# Patient Record
Sex: FEMALE | Race: WHITE | NOT HISPANIC OR LATINO
[De-identification: names, ages, dates, MRNs, and addresses within clinical notes are randomized per-mention and may not be internally consistent; named-entity substitution may affect disease eponyms.]

---

## 2018-11-09 ENCOUNTER — TRANSCRIPTION ENCOUNTER (OUTPATIENT)
Age: 83
End: 2018-11-09

## 2020-09-28 ENCOUNTER — EMERGENCY (EMERGENCY)
Facility: HOSPITAL | Age: 85
LOS: 1 days | Discharge: HOME | End: 2020-09-28
Attending: EMERGENCY MEDICINE
Payer: MEDICAID

## 2020-09-28 PROCEDURE — 99285 EMERGENCY DEPT VISIT HI MDM: CPT

## 2020-09-29 ENCOUNTER — INPATIENT (INPATIENT)
Facility: HOSPITAL | Age: 85
LOS: 2 days | Discharge: HOME | DRG: 445 | End: 2020-10-02
Attending: INTERNAL MEDICINE | Admitting: INTERNAL MEDICINE
Payer: MEDICAID

## 2020-09-29 VITALS
HEART RATE: 73 BPM | TEMPERATURE: 99 F | RESPIRATION RATE: 19 BRPM | SYSTOLIC BLOOD PRESSURE: 113 MMHG | DIASTOLIC BLOOD PRESSURE: 67 MMHG | OXYGEN SATURATION: 95 %

## 2020-09-29 VITALS
RESPIRATION RATE: 16 BRPM | DIASTOLIC BLOOD PRESSURE: 79 MMHG | TEMPERATURE: 103 F | OXYGEN SATURATION: 94 % | SYSTOLIC BLOOD PRESSURE: 130 MMHG | HEART RATE: 80 BPM

## 2020-09-29 VITALS
SYSTOLIC BLOOD PRESSURE: 145 MMHG | TEMPERATURE: 98 F | DIASTOLIC BLOOD PRESSURE: 67 MMHG | RESPIRATION RATE: 18 BRPM | HEART RATE: 68 BPM | OXYGEN SATURATION: 96 %

## 2020-09-29 DIAGNOSIS — R94.5 ABNORMAL RESULTS OF LIVER FUNCTION STUDIES: ICD-10-CM

## 2020-09-29 DIAGNOSIS — I10 ESSENTIAL (PRIMARY) HYPERTENSION: ICD-10-CM

## 2020-09-29 DIAGNOSIS — Z96.649 PRESENCE OF UNSPECIFIED ARTIFICIAL HIP JOINT: Chronic | ICD-10-CM

## 2020-09-29 DIAGNOSIS — Z29.9 ENCOUNTER FOR PROPHYLACTIC MEASURES, UNSPECIFIED: ICD-10-CM

## 2020-09-29 DIAGNOSIS — F41.9 ANXIETY DISORDER, UNSPECIFIED: ICD-10-CM

## 2020-09-29 DIAGNOSIS — I50.9 HEART FAILURE, UNSPECIFIED: ICD-10-CM

## 2020-09-29 DIAGNOSIS — R78.81 BACTEREMIA: ICD-10-CM

## 2020-09-29 LAB
ALBUMIN SERPL ELPH-MCNC: 3.2 G/DL — LOW (ref 3.5–5)
ALBUMIN SERPL ELPH-MCNC: 3.3 G/DL — LOW (ref 3.5–5)
ALP SERPL-CCNC: 207 U/L — HIGH (ref 40–120)
ALP SERPL-CCNC: 228 U/L — HIGH (ref 40–120)
ALT FLD-CCNC: 226 U/L DA — HIGH (ref 10–60)
ALT FLD-CCNC: 304 U/L DA — HIGH (ref 10–60)
ANION GAP SERPL CALC-SCNC: 6 MMOL/L — SIGNIFICANT CHANGE UP (ref 5–17)
ANION GAP SERPL CALC-SCNC: 8 MMOL/L — SIGNIFICANT CHANGE UP (ref 5–17)
APPEARANCE UR: CLEAR — SIGNIFICANT CHANGE UP
APPEARANCE UR: CLEAR — SIGNIFICANT CHANGE UP
APTT BLD: 30.6 SEC — SIGNIFICANT CHANGE UP (ref 27.5–35.5)
AST SERPL-CCNC: 186 U/L — HIGH (ref 10–40)
AST SERPL-CCNC: 526 U/L — HIGH (ref 10–40)
BACTERIA # UR AUTO: ABNORMAL /HPF
BASOPHILS # BLD AUTO: 0.02 K/UL — SIGNIFICANT CHANGE UP (ref 0–0.2)
BASOPHILS # BLD AUTO: 0.03 K/UL — SIGNIFICANT CHANGE UP (ref 0–0.2)
BASOPHILS NFR BLD AUTO: 0.2 % — SIGNIFICANT CHANGE UP (ref 0–2)
BASOPHILS NFR BLD AUTO: 0.3 % — SIGNIFICANT CHANGE UP (ref 0–2)
BILIRUB SERPL-MCNC: 2.4 MG/DL — HIGH (ref 0.2–1.2)
BILIRUB SERPL-MCNC: 2.8 MG/DL — HIGH (ref 0.2–1.2)
BILIRUB UR-MCNC: NEGATIVE — SIGNIFICANT CHANGE UP
BILIRUB UR-MCNC: NEGATIVE — SIGNIFICANT CHANGE UP
BUN SERPL-MCNC: 17 MG/DL — SIGNIFICANT CHANGE UP (ref 7–18)
BUN SERPL-MCNC: 17 MG/DL — SIGNIFICANT CHANGE UP (ref 7–18)
CALCIUM SERPL-MCNC: 8.5 MG/DL — SIGNIFICANT CHANGE UP (ref 8.4–10.5)
CALCIUM SERPL-MCNC: 8.7 MG/DL — SIGNIFICANT CHANGE UP (ref 8.4–10.5)
CHLORIDE SERPL-SCNC: 101 MMOL/L — SIGNIFICANT CHANGE UP (ref 96–108)
CHLORIDE SERPL-SCNC: 102 MMOL/L — SIGNIFICANT CHANGE UP (ref 96–108)
CO2 SERPL-SCNC: 24 MMOL/L — SIGNIFICANT CHANGE UP (ref 22–31)
CO2 SERPL-SCNC: 24 MMOL/L — SIGNIFICANT CHANGE UP (ref 22–31)
COLOR SPEC: YELLOW — SIGNIFICANT CHANGE UP
COLOR SPEC: YELLOW — SIGNIFICANT CHANGE UP
CREAT SERPL-MCNC: 1.04 MG/DL — SIGNIFICANT CHANGE UP (ref 0.5–1.3)
CREAT SERPL-MCNC: 1.06 MG/DL — SIGNIFICANT CHANGE UP (ref 0.5–1.3)
DIFF PNL FLD: ABNORMAL
DIFF PNL FLD: NEGATIVE — SIGNIFICANT CHANGE UP
E COLI DNA BLD POS QL NAA+NON-PROBE: SIGNIFICANT CHANGE UP
EOSINOPHIL # BLD AUTO: 0 K/UL — SIGNIFICANT CHANGE UP (ref 0–0.5)
EOSINOPHIL # BLD AUTO: 0.09 K/UL — SIGNIFICANT CHANGE UP (ref 0–0.5)
EOSINOPHIL NFR BLD AUTO: 0 % — SIGNIFICANT CHANGE UP (ref 0–6)
EOSINOPHIL NFR BLD AUTO: 0.8 % — SIGNIFICANT CHANGE UP (ref 0–6)
EPI CELLS # UR: SIGNIFICANT CHANGE UP /HPF
GLUCOSE SERPL-MCNC: 102 MG/DL — HIGH (ref 70–99)
GLUCOSE SERPL-MCNC: 123 MG/DL — HIGH (ref 70–99)
GLUCOSE UR QL: NEGATIVE — SIGNIFICANT CHANGE UP
GLUCOSE UR QL: NEGATIVE — SIGNIFICANT CHANGE UP
GRAM STN FLD: SIGNIFICANT CHANGE UP
GRAM STN FLD: SIGNIFICANT CHANGE UP
HCT VFR BLD CALC: 36 % — SIGNIFICANT CHANGE UP (ref 34.5–45)
HCT VFR BLD CALC: 36.7 % — SIGNIFICANT CHANGE UP (ref 34.5–45)
HGB BLD-MCNC: 12.1 G/DL — SIGNIFICANT CHANGE UP (ref 11.5–15.5)
HGB BLD-MCNC: 12.5 G/DL — SIGNIFICANT CHANGE UP (ref 11.5–15.5)
IMM GRANULOCYTES NFR BLD AUTO: 0.3 % — SIGNIFICANT CHANGE UP (ref 0–1.5)
IMM GRANULOCYTES NFR BLD AUTO: 0.5 % — SIGNIFICANT CHANGE UP (ref 0–1.5)
INR BLD: 1.13 RATIO — SIGNIFICANT CHANGE UP (ref 0.88–1.16)
KETONES UR-MCNC: ABNORMAL
KETONES UR-MCNC: NEGATIVE — SIGNIFICANT CHANGE UP
LACTATE SERPL-SCNC: 1 MMOL/L — SIGNIFICANT CHANGE UP (ref 0.7–2)
LACTATE SERPL-SCNC: 1.5 MMOL/L — SIGNIFICANT CHANGE UP (ref 0.7–2)
LEUKOCYTE ESTERASE UR-ACNC: ABNORMAL
LEUKOCYTE ESTERASE UR-ACNC: ABNORMAL
LYMPHOCYTES # BLD AUTO: 0.59 K/UL — LOW (ref 1–3.3)
LYMPHOCYTES # BLD AUTO: 1.34 K/UL — SIGNIFICANT CHANGE UP (ref 1–3.3)
LYMPHOCYTES # BLD AUTO: 11.4 % — LOW (ref 13–44)
LYMPHOCYTES # BLD AUTO: 4.5 % — LOW (ref 13–44)
MAGNESIUM SERPL-MCNC: 2.1 MG/DL — SIGNIFICANT CHANGE UP (ref 1.6–2.6)
MCHC RBC-ENTMCNC: 27.3 PG — SIGNIFICANT CHANGE UP (ref 27–34)
MCHC RBC-ENTMCNC: 27.5 PG — SIGNIFICANT CHANGE UP (ref 27–34)
MCHC RBC-ENTMCNC: 33.6 GM/DL — SIGNIFICANT CHANGE UP (ref 32–36)
MCHC RBC-ENTMCNC: 34.1 GM/DL — SIGNIFICANT CHANGE UP (ref 32–36)
MCV RBC AUTO: 80.8 FL — SIGNIFICANT CHANGE UP (ref 80–100)
MCV RBC AUTO: 81.1 FL — SIGNIFICANT CHANGE UP (ref 80–100)
METHOD TYPE: SIGNIFICANT CHANGE UP
MONOCYTES # BLD AUTO: 0.92 K/UL — HIGH (ref 0–0.9)
MONOCYTES # BLD AUTO: 1.5 K/UL — HIGH (ref 0–0.9)
MONOCYTES NFR BLD AUTO: 12.8 % — SIGNIFICANT CHANGE UP (ref 2–14)
MONOCYTES NFR BLD AUTO: 7 % — SIGNIFICANT CHANGE UP (ref 2–14)
NEUTROPHILS # BLD AUTO: 11.52 K/UL — HIGH (ref 1.8–7.4)
NEUTROPHILS # BLD AUTO: 8.73 K/UL — HIGH (ref 1.8–7.4)
NEUTROPHILS NFR BLD AUTO: 74.4 % — SIGNIFICANT CHANGE UP (ref 43–77)
NEUTROPHILS NFR BLD AUTO: 87.8 % — HIGH (ref 43–77)
NITRITE UR-MCNC: NEGATIVE — SIGNIFICANT CHANGE UP
NITRITE UR-MCNC: POSITIVE
NRBC # BLD: 0 /100 WBCS — SIGNIFICANT CHANGE UP (ref 0–0)
NRBC # BLD: 0 /100 WBCS — SIGNIFICANT CHANGE UP (ref 0–0)
NT-PROBNP SERPL-SCNC: 2408 PG/ML — HIGH (ref 0–450)
PH UR: 5 — SIGNIFICANT CHANGE UP (ref 5–8)
PH UR: 6 — SIGNIFICANT CHANGE UP (ref 5–8)
PLATELET # BLD AUTO: 196 K/UL — SIGNIFICANT CHANGE UP (ref 150–400)
PLATELET # BLD AUTO: 197 K/UL — SIGNIFICANT CHANGE UP (ref 150–400)
POTASSIUM SERPL-MCNC: 3.8 MMOL/L — SIGNIFICANT CHANGE UP (ref 3.5–5.3)
POTASSIUM SERPL-MCNC: 3.9 MMOL/L — SIGNIFICANT CHANGE UP (ref 3.5–5.3)
POTASSIUM SERPL-SCNC: 3.8 MMOL/L — SIGNIFICANT CHANGE UP (ref 3.5–5.3)
POTASSIUM SERPL-SCNC: 3.9 MMOL/L — SIGNIFICANT CHANGE UP (ref 3.5–5.3)
PROT SERPL-MCNC: 6.7 G/DL — SIGNIFICANT CHANGE UP (ref 6–8.3)
PROT SERPL-MCNC: 6.8 G/DL — SIGNIFICANT CHANGE UP (ref 6–8.3)
PROT UR-MCNC: NEGATIVE — SIGNIFICANT CHANGE UP
PROT UR-MCNC: NEGATIVE — SIGNIFICANT CHANGE UP
PROTHROM AB SERPL-ACNC: 13.1 SEC — SIGNIFICANT CHANGE UP (ref 10.6–13.6)
RBC # BLD: 4.44 M/UL — SIGNIFICANT CHANGE UP (ref 3.8–5.2)
RBC # BLD: 4.54 M/UL — SIGNIFICANT CHANGE UP (ref 3.8–5.2)
RBC # FLD: 13.2 % — SIGNIFICANT CHANGE UP (ref 10.3–14.5)
RBC # FLD: 13.3 % — SIGNIFICANT CHANGE UP (ref 10.3–14.5)
RBC CASTS # UR COMP ASSIST: SIGNIFICANT CHANGE UP /HPF (ref 0–2)
SARS-COV-2 RNA SPEC QL NAA+PROBE: SIGNIFICANT CHANGE UP
SARS-COV-2 RNA SPEC QL NAA+PROBE: SIGNIFICANT CHANGE UP
SODIUM SERPL-SCNC: 132 MMOL/L — LOW (ref 135–145)
SODIUM SERPL-SCNC: 133 MMOL/L — LOW (ref 135–145)
SP GR SPEC: 1.01 — SIGNIFICANT CHANGE UP (ref 1.01–1.02)
SP GR SPEC: 1.01 — SIGNIFICANT CHANGE UP (ref 1.01–1.02)
SPECIMEN SOURCE: SIGNIFICANT CHANGE UP
SPECIMEN SOURCE: SIGNIFICANT CHANGE UP
TROPONIN I SERPL-MCNC: <0.015 NG/ML — SIGNIFICANT CHANGE UP (ref 0–0.04)
TROPONIN I SERPL-MCNC: <0.015 NG/ML — SIGNIFICANT CHANGE UP (ref 0–0.04)
UROBILINOGEN FLD QL: 1
UROBILINOGEN FLD QL: NEGATIVE — SIGNIFICANT CHANGE UP
WBC # BLD: 11.72 K/UL — HIGH (ref 3.8–10.5)
WBC # BLD: 13.11 K/UL — HIGH (ref 3.8–10.5)
WBC # FLD AUTO: 11.72 K/UL — HIGH (ref 3.8–10.5)
WBC # FLD AUTO: 13.11 K/UL — HIGH (ref 3.8–10.5)
WBC UR QL: SIGNIFICANT CHANGE UP /HPF (ref 0–5)

## 2020-09-29 PROCEDURE — 96374 THER/PROPH/DIAG INJ IV PUSH: CPT

## 2020-09-29 PROCEDURE — 99222 1ST HOSP IP/OBS MODERATE 55: CPT

## 2020-09-29 PROCEDURE — 76705 ECHO EXAM OF ABDOMEN: CPT | Mod: 26

## 2020-09-29 PROCEDURE — 71045 X-RAY EXAM CHEST 1 VIEW: CPT

## 2020-09-29 PROCEDURE — 85025 COMPLETE CBC W/AUTO DIFF WBC: CPT

## 2020-09-29 PROCEDURE — 93005 ELECTROCARDIOGRAM TRACING: CPT

## 2020-09-29 PROCEDURE — 81001 URINALYSIS AUTO W/SCOPE: CPT

## 2020-09-29 PROCEDURE — 82962 GLUCOSE BLOOD TEST: CPT

## 2020-09-29 PROCEDURE — 85730 THROMBOPLASTIN TIME PARTIAL: CPT

## 2020-09-29 PROCEDURE — 99284 EMERGENCY DEPT VISIT MOD MDM: CPT | Mod: 25

## 2020-09-29 PROCEDURE — 80053 COMPREHEN METABOLIC PANEL: CPT

## 2020-09-29 PROCEDURE — 87150 DNA/RNA AMPLIFIED PROBE: CPT

## 2020-09-29 PROCEDURE — 87086 URINE CULTURE/COLONY COUNT: CPT

## 2020-09-29 PROCEDURE — 85610 PROTHROMBIN TIME: CPT

## 2020-09-29 PROCEDURE — 99285 EMERGENCY DEPT VISIT HI MDM: CPT

## 2020-09-29 PROCEDURE — 87186 SC STD MICRODIL/AGAR DIL: CPT

## 2020-09-29 PROCEDURE — 87040 BLOOD CULTURE FOR BACTERIA: CPT

## 2020-09-29 PROCEDURE — 71045 X-RAY EXAM CHEST 1 VIEW: CPT | Mod: 26,77

## 2020-09-29 PROCEDURE — 36415 COLL VENOUS BLD VENIPUNCTURE: CPT

## 2020-09-29 PROCEDURE — 83605 ASSAY OF LACTIC ACID: CPT

## 2020-09-29 PROCEDURE — 71045 X-RAY EXAM CHEST 1 VIEW: CPT | Mod: 26

## 2020-09-29 PROCEDURE — 87635 SARS-COV-2 COVID-19 AMP PRB: CPT

## 2020-09-29 PROCEDURE — 84484 ASSAY OF TROPONIN QUANT: CPT

## 2020-09-29 RX ORDER — CEFUROXIME AXETIL 250 MG
1 TABLET ORAL
Qty: 14 | Refills: 0
Start: 2020-09-29 | End: 2020-10-05

## 2020-09-29 RX ORDER — ACETAMINOPHEN 500 MG
650 TABLET ORAL ONCE
Refills: 0 | Status: COMPLETED | OUTPATIENT
Start: 2020-09-29 | End: 2020-09-29

## 2020-09-29 RX ORDER — MEROPENEM 1 G/30ML
1000 INJECTION INTRAVENOUS ONCE
Refills: 0 | Status: COMPLETED | OUTPATIENT
Start: 2020-09-29 | End: 2020-09-29

## 2020-09-29 RX ORDER — ENOXAPARIN SODIUM 100 MG/ML
40 INJECTION SUBCUTANEOUS DAILY
Refills: 0 | Status: DISCONTINUED | OUTPATIENT
Start: 2020-09-29 | End: 2020-10-02

## 2020-09-29 RX ORDER — ACETAMINOPHEN 500 MG
2 TABLET ORAL
Qty: 20 | Refills: 0
Start: 2020-09-29

## 2020-09-29 RX ORDER — LANOLIN ALCOHOL/MO/W.PET/CERES
5 CREAM (GRAM) TOPICAL AT BEDTIME
Refills: 0 | Status: DISCONTINUED | OUTPATIENT
Start: 2020-09-29 | End: 2020-10-02

## 2020-09-29 RX ORDER — SODIUM CHLORIDE 9 MG/ML
2100 INJECTION INTRAMUSCULAR; INTRAVENOUS; SUBCUTANEOUS ONCE
Refills: 0 | Status: COMPLETED | OUTPATIENT
Start: 2020-09-29 | End: 2020-09-29

## 2020-09-29 RX ORDER — CEFTRIAXONE 500 MG/1
1000 INJECTION, POWDER, FOR SOLUTION INTRAMUSCULAR; INTRAVENOUS ONCE
Refills: 0 | Status: COMPLETED | OUTPATIENT
Start: 2020-09-29 | End: 2020-09-29

## 2020-09-29 RX ADMIN — CEFTRIAXONE 100 MILLIGRAM(S): 500 INJECTION, POWDER, FOR SOLUTION INTRAMUSCULAR; INTRAVENOUS at 01:25

## 2020-09-29 RX ADMIN — Medication 650 MILLIGRAM(S): at 02:52

## 2020-09-29 RX ADMIN — Medication 650 MILLIGRAM(S): at 01:25

## 2020-09-29 RX ADMIN — MEROPENEM 100 MILLIGRAM(S): 1 INJECTION INTRAVENOUS at 19:34

## 2020-09-29 RX ADMIN — SODIUM CHLORIDE 2100 MILLILITER(S): 9 INJECTION INTRAMUSCULAR; INTRAVENOUS; SUBCUTANEOUS at 01:26

## 2020-09-29 NOTE — ED ADULT NURSE NOTE - CHPI ED NUR SYMPTOMS NEG
no nausea/no chills/no tingling/no weakness/no pain/no vomiting/no decreased eating/drinking/no dizziness/no fever

## 2020-09-29 NOTE — ED PROVIDER NOTE - OBJECTIVE STATEMENT
as per daughter 89 y.o. female was seen in ED yest. for on & off MSCP/epigastric pain since yest. afternoon, pt vomited x3 yest. fever, chills last night, d/c home with Ceftin.  Pt continues with fever, weakness, no appetite, no coughing.  Daughter received a phone for abn blood culture-gram neg rods.  Last BM yest x2 loose stool, no dysuria.  Last given Tylenol this am as per daughter 89 y.o. female was seen in ED yest. for on & off MSCP/epigastric pain since yest. afternoon, pt vomited x3 yest. fever, chills last night, d/c home with Ceftin.  Pt continues with fever, weakness, no appetite, no coughing.  Daughter received a phone for abn blood culture-gram neg rods.  Last BM yest x2 loose stool, no dysuria, CP, abd pain.  Last given Tylenol this am

## 2020-09-29 NOTE — H&P ADULT - PROBLEM SELECTOR PLAN 4
Additional Safety/Bands: Patient takes Ramipril, Amlodipine and Bisoprolol.  will hold blood pressure medications for stable blood pressure.  restart as indicated Patient takes Ramipril, Amlodipine and Bisoprolol.  continue with home meds

## 2020-09-29 NOTE — ED PROVIDER NOTE - OBJECTIVE STATEMENT
89 y/o female (Andorran translation requested by adult daughter) presents to ED. Daughter states patient complained of chest pain yesterday afternoon and spiked a fever in the evening. No shortness of breath. No sick contacts reported. Patient was febrile on arrival. Code sepsis activated. Early abx and fluids administered. On evaluation patient feels better and is currently without complaints.

## 2020-09-29 NOTE — ED PROVIDER NOTE - NSFOLLOWUPINSTRUCTIONS_ED_ALL_ED_FT
Return if pain, fever, Return to ER immediately if your chest pain returns, if you have pain with radiation to arms, back or neck, if you feel short of breath, feel weak, feel fast or pounding heart beating, if you have any fever or vomiting.  If you need assistance with follow up appointments our Care Coordinator can be reached at [phone]. In addition the [facility] is located at [address] [place] [address], tel: [phone].

## 2020-09-29 NOTE — H&P ADULT - NSHPPHYSICALEXAM_GEN_ALL_CORE
Vital Signs Last 24 Hrs  T(C): 36.9 (29 Sep 2020 20:08), Max: 39.4 (29 Sep 2020 00:04)  T(F): 98.5 (29 Sep 2020 20:08), Max: 103 (29 Sep 2020 00:04)  HR: 67 (29 Sep 2020 20:08) (67 - 80)  BP: 159/75 (29 Sep 2020 20:08) (110/62 - 159/75)  BP(mean): --  RR: 17 (29 Sep 2020 20:08) (16 - 22)  SpO2: 96% (29 Sep 2020 20:08) (94% - 96%)    GENERAL: NAD, speaks in full sentences, no signs of respiratory distress  HEAD:  Atraumatic, Normocephalic  EYES: EOMI, PERRLA, conjunctiva and sclera clear  NECK: Supple, No JVD  CHEST/LUNG: Clear to auscultation bilaterally; No wheeze; No crackles; No accessory muscles used  HEART: Regular rate and rhythm; No murmurs;   ABDOMEN: Soft, Nontender, Nondistended; Bowel sounds present; No guarding  EXTREMITIES:  2+ Peripheral Pulses, No cyanosis or edema  RLE: small akin abrasion on anterior shin of R lower leg.   PSYCH:  Normal Affect  NEUROLOGY: non-focal, AAO X 3. No new deficits.  SKIN: small skin abrasion on anterior shin of R lower leg.

## 2020-09-29 NOTE — ED PROVIDER NOTE - NSFOLLOWUPCLINICS_GEN_ALL_ED_FT
[facility] [place]  [facility]  [address] [facility] [place] [address]  Phone: [phone]  Fax: [phone]  Follow Up Time:

## 2020-09-29 NOTE — H&P ADULT - PROBLEM SELECTOR PLAN 2
Patients ProBNP is elevated ( dyan than age corrected .  CXR showed some discoid atelectasias .  no signs of fluid over load on P/E  Follow ECHO cardiogram and cardiology recommendations.  Patient is on ACE and Bblocker . holding for stable blood pressure. Patients ProBNP is elevated ( more than age corrected .  CXR showed some discoid atelectasias .  no signs of fluid over load on P/E  Follow ECHO cardiogram and cardiology recommendations.  will continue with ACE and b Blocker  Lasix 40 mg PO added

## 2020-09-29 NOTE — H&P ADULT - ASSESSMENT
89 year old Malagasy speaking female AAO X 3 , lives with daughter presented to ED for epigastric /  mid sternal pain. Patient had 3 episodes of NBNB vomiting yesterday .   Patient admitted for treatment of bacteremia/ cellulitis

## 2020-09-29 NOTE — H&P ADULT - PROBLEM SELECTOR PLAN 1
Patients blood cultures grew gram negative Rods , final sensitivities pending.  Patient received 1 ose of Rocephin last night and 1 dose of meropenem tonigh.  Will start her on Zosyn .  Source of bacteremia canbe UTI as her UA was positive from yesterday with fever on presentation.  US hepatic and pancreatic done and noted , no intra abdominal pathology noted except for  10.4 cm. 5 mm sized cortical cyst in kidney.  follow reeat blood cultures sent today ( 9/29/20) Patients blood cultures grew gram negative Rods , final sensitivities pending.  Patient received 1 ose of Rocephin last night and 1 dose of meropenem tonigh.  Will start her on Augmentin  Source of bacteremia can be UTI as her UA was positive from yesterday with fever on presentation. or she has RLE laceration resulting from dog bite   US hepatic and pancreatic done and noted , no intra abdominal pathology noted except for  10.4 cm. 5 mm sized cortical cyst in kidney.  follow repeat blood cultures sent today ( 9/29/20)

## 2020-09-29 NOTE — H&P ADULT - PROBLEM SELECTOR PLAN 3
Patients AST, ALT, ALP and bilirubin are elevated,  US done and noted no liver or gall bladder pathology noted   will continue to trend Patients AST, ALT, ALP and bilirubin are elevated,  US done and noted showed hepatic steatosis, no  gall bladder pathology noted   will continue to trend  follow CT abdomen

## 2020-09-29 NOTE — ED POST DISCHARGE NOTE - ADDITIONAL DOCUMENTATION
All labs from Emergency Department visit which was immediately followed by admission will be addressed by inpatient team

## 2020-09-29 NOTE — ED PROVIDER NOTE - CLINICAL SUMMARY MEDICAL DECISION MAKING FREE TEXT BOX
Patient currently asymptomatic. Feels well. Requesting dc. Agrees for second set troponin prior to dc. Patient currently asymptomatic. Feels well. Requesting dc. Agrees for second set troponin prior to dc.  Trop x 2 neg, UA consistent with uti. Rx Ceftin. Pt is well appearing, has no new complaints and able to walk with normal gait. Pt is stable for discharge and follow up with medical doctor. Pt educated on care and need for follow up. Discussed anticipatory guidance and return precautions. Questions answered. I had a detailed discussion with the patient and/or guardian regarding the historical points, exam findings, and any diagnostic results supporting the discharge diagnosis.  Daughter will accompany pt home.

## 2020-09-29 NOTE — ED ADULT NURSE NOTE - NSFALLRSKHRMRISKTYPE_ED_ALL_ED
How Did The Scalp Condition Occur?: sudden in onset (over a period of weeks to a few months)
How Severe Is The Scalp Condition?: moderate
Additional History: Using t gel and head and shoulders
age(85 years old or older)

## 2020-09-29 NOTE — ED PROVIDER NOTE - MUSCULOSKELETAL, MLM
Spine appears normal, range of motion is not limited, no muscle or joint tenderness RLE- ant lower aspect-abrasion

## 2020-09-29 NOTE — ED ADULT NURSE NOTE - INTERVENTIONS DEFINITIONS
[place] to call system/Instruct patient to call for assistance/Stretcher in lowest position, wheels locked, appropriate side rails in place/Room bathroom lighting operational/Non-slip footwear when patient is off stretcher/Physically safe environment: no spills, clutter or unnecessary equipment/Provide visual clues: red socks/Call bell, personal items and telephone within reach/Monitor for mental status changes and reorient to person, place, and time/Monitor gait and stability

## 2020-09-29 NOTE — H&P ADULT - ATTENDING COMMENTS
Patient is a 89 year old female with a PMH of Heart Murmur, umbilical hernia, HTN who presented with a chief complaint of chest pain.  ([place]  ID: [id number]).  Patient states that over the course of the past 24 hours prior to presentation, she has been experiencing intermittent, midsternal chest and epigastric discomfort.  In addition, patient endorses nausea along with 3 episodes of non-bloody emesis, subjective fever/chills as well generalized weakness.  Of note, patient states that for the past 50 years, when she moves a certain way, she experiences pain in the right upper quadrant of her abdomen.    Patient endorses that approximately one week prior to presentation (though daughter, who was at bedside states it may be up to three weeks ago), patient was bitten by a dog on the anterior aspect of her right lower extremity.  Since the bite, the area has become somewhat painful with and without palpation and "more red".    Additionally of note, patient was seen in the [facility] ED on 9/28/2020 with similar symptoms.  Patient was discharged on Ceftin, though as her Blood Cultures returned positive for gram negative rods, patient was contacted and asked to return to [facility] ED for further evaluation.    T(C): 37.1 (09-29-20 @ 23:46), Max: 39.4 (09-29-20 @ 00:04)  T(F): 98.8 (09-29-20 @ 23:46), Max: 103 (09-29-20 @ 00:04)  HR: 62 (09-29-20 @ 23:46) (62 - 80)  BP: 129/66 (09-29-20 @ 23:46) (110/62 - 159/75)  RR: 18 (09-29-20 @ 23:46) (16 - 22)  SpO2: 95% (09-29-20 @ 23:46) (94% - 96%)  Wt(kg): --  P/E: As above MAR    A/P:    Acute CHF Exacerbation  -HEART Score= 3 (Age >=65, 1-2 Risk Factors), Low Score, Risk of MACE of 0.9-1.7%.  -STEPHANIE Score= 2 (Age >=65, Severe Angina >=2 episodes in 24 hours), 8% risk at 14 days of: all-cause mortality, new or recurrent MI, or severe recurrent ischemia requiring urgent revascularization.    -Pro-BNP= 2,408  -Will admit patient to Telemetry for continuous cardiac monitoring  -TTE  -Will continue to trend troponins with simultaneous acquisition of EKG  -Gentle diuresis  -Strict I&O  -Heart Healthy diet  -Fluid Restrictions  -Vital Signs Q4H  -Patient's  (passed away 4 years ago) was previously managing her "Heart murmur".  Therefore, should ask Cardiology to evaluate patient    Cellulitis of Right Lower Extremity:  -ALT-70 Score= 6 (Asymmetric, Age >=70, WBC in ED >=10,000), >82.2% likelihood of true cellulitis    -Patient's blood cultures (from date 9/28/2020) identified gram negative rods, though as patient has no s/sx of sepsis, but has a possible source of infection being from a dog bite approximately 1 week prior to presentation resulting in an infection with pasteurella aeruginosa (as well as with a variety of other potential microorganisms [including, but not limited to Pasteurella multocida and canis, Staphylococcus and Streptococcus species, Capnocytophaga canimorsus as well as other Gram-negative bacteria], will empirically treat with Augmentin 875/125mg BID, for now.  -Additionally, should obtain medical records from PCP of vaccination history (to verify Rabies vaccination history).  -No other apparently obvious source of infection, thus will also order CT Abdomen/Pelvis without IV contrast    -Should send Procalcitonin, ESR, CRP  -Should ask Infectious Disease (ID) to evaluate patient for further recommendations to narrow down antibiotic regiment    HTN:  -May continue patient's home medication regiment  -Vital Signs Q4H    GI/DVT PPx:  -Heparin  -Pepcid Patient is a 89 year old female with a PMH of Heart Murmur, umbilical hernia, HTN who presented with a chief complaint of chest pain.  ([place]agu[place]  ID: [id number]).  Patient states that over the course of the past 24 hours prior to presentation, she has been experiencing intermittent, midsternal chest and epigastric discomfort.  In addition, patient endorses nausea along with 3 episodes of non-bloody emesis, subjective fever/chills as well generalized weakness.  Of note, patient states that for the past 50 years, when she moves a certain way, she experiences pain in the right upper quadrant of her abdomen.    Patient endorses that approximately one week prior to presentation (though daughter, who was at bedside states it may be up to three weeks ago), patient was bitten by a dog on the anterior aspect of her right lower extremity.  Since the bite, the area has become somewhat painful with and without palpation and "more red".    Additionally of note, patient was seen in the [facility] ED on 9/28/2020 with similar symptoms.  Patient was discharged on Ceftin, though as her Blood Cultures returned positive for gram negative rods, patient was contacted and asked to return to [facility] ED for further evaluation.    T(C): 37.1 (09-29-20 @ 23:46), Max: 39.4 (09-29-20 @ 00:04)  T(F): 98.8 (09-29-20 @ 23:46), Max: 103 (09-29-20 @ 00:04)  HR: 62 (09-29-20 @ 23:46) (62 - 80)  BP: 129/66 (09-29-20 @ 23:46) (110/62 - 159/75)  RR: 18 (09-29-20 @ 23:46) (16 - 22)  SpO2: 95% (09-29-20 @ 23:46) (94% - 96%)  Wt(kg): --    P/E: As above MAR    A/P:    Acute CHF Exacerbation  -HEART Score= 3 (Age >=65, 1-2 Risk Factors), Low Score, Risk of MACE of 0.9-1.7%.  -STEPHANIE Score= 2 (Age >=65, Severe Angina >=2 episodes in 24 hours), 8% risk at 14 days of: all-cause mortality, new or recurrent MI, or severe recurrent ischemia requiring urgent revascularization.    -Pro-BNP= 2,408  -Will admit patient to Telemetry for continuous cardiac monitoring  -TTE  -Will continue to trend troponins with simultaneous acquisition of EKG  -Gentle diuresis  -Strict I&O  -Heart Healthy diet  -Fluid Restrictions  -Vital Signs Q4H  -Patient's  (passed away 4 years ago) was previously managing her "Heart murmur".  Therefore, should ask Cardiology to evaluate patient    Cellulitis of Right Lower Extremity:  -ALT-70 Score= 6 (Asymmetric, Age >=70, WBC in ED >=10,000), >82.2% likelihood of true cellulitis    -Patient's blood cultures (from date 9/28/2020) identified gram negative rods, though as patient has no s/sx of sepsis, but has a possible source of infection being from a dog bite approximately 1 week prior to presentation resulting in an infection with pasteurella aeruginosa (as well as with a variety of other potential microorganisms [including, but not limited to Pasteurella multocida and canis, Staphylococcus and Streptococcus species, Capnocytophaga canimorsus as well as other Gram-negative bacteria], will empirically treat with Augmentin 875/125mg BID, for now.  -Additionally, should obtain medical records from PCP of vaccination history (to verify Rabies vaccination history).  -No other apparently obvious source of infection, thus will also order CT Abdomen/Pelvis without IV contrast    -Should send Procalcitonin, ESR, CRP  -Should ask Infectious Disease (ID) to evaluate patient for further recommendations to narrow down antibiotic regiment    Transaminitis:  -Ultrasound Abdomen identified Hepatic steatosis.  No hydronephrosis or urolithiasis.  -Will continue to monitor with AM labs and if necessary, should consider consulting Gastroenterology, though not necessary at this time.    HTN:  -May continue patient's home medication regiment  -Vital Signs Q4H    GI/DVT PPx:  -Heparin  -Pepcid Patient is a 89 year old female with a PMH of Heart Murmur, umbilical hernia, HTN who presented with a chief complaint of chest pain.  (Jordanian  ID: [id number]).  Patient states that over the course of the past 24 hours prior to presentation, she has been experiencing intermittent, midsternal chest and epigastric discomfort.  In addition, patient endorses nausea along with 3 episodes of non-bloody emesis, subjective fever/chills as well generalized weakness.  Of note, patient states that for the past 50 years, when she moves a certain way, she experiences pain in the right upper quadrant of her abdomen.    Patient endorses that approximately one week prior to presentation (though daughter, who was at bedside states it may be up to three weeks ago), patient was bitten by a dog on the anterior aspect of her right lower extremity.  Since the bite, the area has become somewhat painful with and without palpation and "more red".    Additionally of note, patient was seen in the [facility] ED on 9/28/2020 with similar symptoms.  Patient was discharged on Ceftin, though as her Blood Cultures returned positive for gram negative rods, patient was contacted and asked to return to [facility] ED for further evaluation.    T(C): 37.1 (09-29-20 @ 23:46), Max: 39.4 (09-29-20 @ 00:04)  T(F): 98.8 (09-29-20 @ 23:46), Max: 103 (09-29-20 @ 00:04)  HR: 62 (09-29-20 @ 23:46) (62 - 80)  BP: 129/66 (09-29-20 @ 23:46) (110/62 - 159/75)  RR: 18 (09-29-20 @ 23:46) (16 - 22)  SpO2: 95% (09-29-20 @ 23:46) (94% - 96%)  Wt(kg): --    P/E: As above MAR    A/P:    Acute CHF Exacerbation  -HEART Score= 3 (Age >=65, 1-2 Risk Factors), Low Score, Risk of MACE of 0.9-1.7%.  -STEPHANIE Score= 2 (Age >=65, Severe Angina >=2 episodes in 24 hours), 8% risk at 14 days of: all-cause mortality, new or recurrent MI, or severe recurrent ischemia requiring urgent revascularization.    -Pro-BNP= 2,408  -Will admit patient to Telemetry for continuous cardiac monitoring  -TTE  -Will continue to trend troponins with simultaneous acquisition of EKG  -Gentle diuresis  -Strict I&O  -Heart Healthy diet  -Fluid Restrictions  -Vital Signs Q4H  -Patient's  (passed away 4 years ago) was previously managing her "Heart murmur".  Therefore, should ask Cardiology to evaluate patient    Cellulitis of Right Lower Extremity:  -ALT-70 Score= 6 (Asymmetric, Age >=70, WBC in ED >=10,000), >82.2% likelihood of true cellulitis    -Patient's blood cultures (from date 9/28/2020) identified gram negative rods, though as patient has no s/sx of sepsis, but has a possible source of infection being from a dog bite approximately 1 week prior to presentation resulting in an infection with pasteurella aeruginosa (as well as with a variety of other potential microorganisms [including, but not limited to Pasteurella multocida and canis, Staphylococcus and Streptococcus species, Capnocytophaga canimorsus as well as other Gram-negative bacteria], will empirically treat with Augmentin 875/125mg BID, for now.  -Additionally, should obtain medical records from PCP of vaccination history (to verify Rabies vaccination history).  -No other apparently obvious source of infection, thus will also order CT Abdomen/Pelvis without IV contrast    -Should send Procalcitonin, ESR, CRP  -Tylenol PRN for fever  -Should ask Infectious Disease (ID) to evaluate patient for further recommendations to narrow down antibiotic regiment    Transaminitis:  -Ultrasound Abdomen identified Hepatic steatosis.  No hydronephrosis or urolithiasis.  -Will continue to monitor with AM labs and if necessary, should consider consulting Gastroenterology, though not necessary at this time.    HTN:  -May continue patient's home medication regiment  -Vital Signs Q4H    GI/DVT PPx:  -Heparin  -Pepcid Patient is a 89 year old female with a PMH of Heart Murmur, umbilical hernia, HTN who presented with a chief complaint of chest pain.  ([place]de[place]  ID: [id number]).  Patient states that over the course of the past 24 hours prior to presentation, she has been experiencing intermittent, midsternal chest and epigastric discomfort.  In addition, patient endorses nausea along with 3 episodes of non-bloody emesis, subjective fever/chills as well generalized weakness.  Of note, patient states that for the past 50 years, when she moves a certain way, she experiences pain in the right upper quadrant of her abdomen.    Patient endorses that approximately one week prior to presentation (though daughter, who was at bedside states it may be up to three weeks ago), patient was bitten by a dog on the anterior aspect of her right lower extremity.  Since the bite, the area has become somewhat painful with and without palpation and "more red".    Additionally of note, patient was seen in the [facility] ED on 9/28/2020 with similar symptoms.  Patient was discharged on Ceftin, though as her Blood Cultures returned positive for gram negative rods, patient was contacted and asked to return to [facility] ED for further evaluation.    T(C): 37.1 (09-29-20 @ 23:46), Max: 39.4 (09-29-20 @ 00:04)  T(F): 98.8 (09-29-20 @ 23:46), Max: 103 (09-29-20 @ 00:04)  HR: 62 (09-29-20 @ 23:46) (62 - 80)  BP: 129/66 (09-29-20 @ 23:46) (110/62 - 159/75)  RR: 18 (09-29-20 @ 23:46) (16 - 22)  SpO2: 95% (09-29-20 @ 23:46) (94% - 96%)  Wt(kg): --    P/E: As above MAR    A/P:    Acute CHF Exacerbation  -HEART Score= 3 (Age >=65, 1-2 Risk Factors), Low Score, Risk of MACE of 0.9-1.7%.  -STEPHANIE Score= 2 (Age >=65, Severe Angina >=2 episodes in 24 hours), 8% risk at 14 days of: all-cause mortality, new or recurrent MI, or severe recurrent ischemia requiring urgent revascularization.    -Pro-BNP= 2,408  -Will admit patient to Telemetry for continuous cardiac monitoring  -TTE  -Will continue to trend troponins with simultaneous acquisition of EKG  -Gentle diuresis  -Strict I&O  -Heart Healthy diet  -Fluid Restrictions  -Vital Signs Q4H  -Patient's  (passed away 4 years ago) was previously managing her "Heart murmur".  Therefore, should ask Cardiology to evaluate patient    Cellulitis of Right Lower Extremity:  -ALT-70 Score= 6 (Asymmetric, Age >=70, WBC in ED >=10,000), >82.2% likelihood of true cellulitis    -Patient's blood cultures (from date 9/28/2020) identified gram negative rods, though as patient has no s/sx of sepsis, but has a possible source of infection being from a dog bite approximately 1 week prior to presentation resulting in an infection with pasteurella aeruginosa (as well as with a variety of other potential microorganisms [including, but not limited to Pasteurella multocida and canis, Staphylococcus and Streptococcus species, Capnocytophaga canimorsus as well as other Gram-negative bacteria], will empirically treat with Augmentin 875/125mg BID, for now.  -Additionally, should obtain medical records from PCP of vaccination history (to verify Rabies vaccination history).  -Aside from recent UTI, as there are no other apparently obvious source(s) of infection at this time, will therefore order CT Abdomen/Pelvis without IV contrast    -Should send Procalcitonin, ESR, CRP  -Tylenol PRN for fever  -Should ask Infectious Disease (ID) to evaluate patient for further recommendations to narrow down antibiotic regiment    Transaminitis:  -Ultrasound Abdomen identified Hepatic steatosis.  No hydronephrosis or urolithiasis.  -Will continue to monitor with AM labs and if necessary, should consider consulting Gastroenterology, though not necessary at this time.    HTN:  -May continue patient's home medication regiment  -Vital Signs Q4H    GI/DVT PPx:  -Heparin  -Pepcid Patient is a 89 year old female with a PMH of Heart Murmur, umbilical hernia, HTN who presented with a chief complaint of chest pain.  (Ukrainian  ID: [id number]).  Patient states that over the course of the past 24 hours prior to presentation, she has been experiencing intermittent, midsternal chest and epigastric discomfort.  In addition, patient endorses nausea along with 3 episodes of non-bloody emesis, subjective fever/chills as well generalized weakness.  Of note, patient states that for the past 50 years, when she moves a certain way, she experiences pain in the right upper quadrant of her abdomen.    Patient endorses that approximately one week prior to presentation (though daughter, who was at bedside states it may be up to three weeks ago), patient was bitten by a dog on the anterior aspect of her right lower extremity.  Since the bite, the area has become somewhat painful with and without palpation and "more red".    Additionally of note, patient was seen in the [facility] ED on 9/28/2020 with similar symptoms.  Patient was discharged on Ceftin, though as her Blood Cultures returned positive for gram negative rods, patient was contacted and asked to return to [facility] ED for further evaluation.    T(C): 37.1 (09-29-20 @ 23:46), Max: 39.4 (09-29-20 @ 00:04)  T(F): 98.8 (09-29-20 @ 23:46), Max: 103 (09-29-20 @ 00:04)  HR: 62 (09-29-20 @ 23:46) (62 - 80)  BP: 129/66 (09-29-20 @ 23:46) (110/62 - 159/75)  RR: 18 (09-29-20 @ 23:46) (16 - 22)  SpO2: 95% (09-29-20 @ 23:46) (94% - 96%)  Wt(kg): --    P/E: As above MAR    A/P:    Acute CHF Exacerbation  -HEART Score= 3 (Age >=65, 1-2 Risk Factors), Low Score, Risk of MACE of 0.9-1.7%.  -STEPHANIE Score= 2 (Age >=65, Severe Angina >=2 episodes in 24 hours), 8% risk at 14 days of: all-cause mortality, new or recurrent MI, or severe recurrent ischemia requiring urgent revascularization.    -Pro-BNP= 2,408  -Will admit patient to Telemetry for continuous cardiac monitoring  -TTE  -Will continue to trend troponins with simultaneous acquisition of EKG  -Gentle diuresis  -Strict I&O  -Heart Healthy diet  -Fluid Restrictions  -Vital Signs Q4H  -Patient's  (passed away 4 years ago) was previously managing her "Heart murmur".  Therefore, should ask Cardiology to evaluate patient    Cellulitis of Right Lower Extremity:  -ALT-70 Score= 6 (Asymmetric, Age >=70, WBC in ED >=10,000), >82.2% likelihood of true cellulitis    -Patient's blood cultures (from date 9/28/2020) identified gram negative rods, though as patient has no s/sx of sepsis, but has a possible source of infection being from a dog bite approximately 1 week prior to presentation resulting in an infection with pasteurella aeruginosa (as well as with a variety of other potential microorganisms [including, but not limited to Pasteurella multocida and canis, Staphylococcus and Streptococcus species, Capnocytophaga canimorsus as well as other Gram-negative bacteria], will empirically treat with Augmentin 875/125mg BID, for now.  -Additionally, should obtain medical records from PCP of vaccination history (to verify Rabies vaccination history).  -Aside from recent UTI, as there are no other apparently obvious source(s) of infection at this time, will therefore order CT Abdomen/Pelvis without IV contrast    -Should send Procalcitonin, ESR, CRP  -Tylenol PRN for fever  -Should ask Infectious Disease (ID) to evaluate patient for further recommendations to narrow down antibiotic regiment    Transaminitis:  -Ultrasound Abdomen identified Hepatic steatosis.  No hydronephrosis or urolithiasis.  -Will continue to monitor with AM labs and if necessary, should consider consulting Gastroenterology, though not necessary at this time.    Renal Cyst:  -Ultrasound Abdomen identified of the right kidney: 10.4 cm. 5 mm sized cortical cyst. Slightly prominent right renal pelvis.  -Patient should follow-up with Urology as an outpatient    HTN:  -May continue patient's home medication regiment  -Vital Signs Q4H    GI/DVT PPx:  -Heparin  -Pepcid Patient is a 89 year old female with a PMH of Heart Murmur, umbilical hernia, HTN who presented with a chief complaint of chest pain.  (Maldivian  ID: [id number]).  Patient states that over the course of the past 24 hours prior to presentation, she has been experiencing intermittent, midsternal chest and epigastric discomfort.  In addition, patient endorses nausea along with 3 episodes of non-bloody emesis, subjective fever/chills as well generalized weakness.  Of note, patient states that for the past 50 years, when she moves a certain way, she experiences pain in the right upper quadrant of her abdomen.    Patient endorses that approximately one week prior to presentation (though daughter, who was at bedside states it may be up to three weeks ago), patient was bitten by a dog on the anterior aspect of her right lower extremity.  Since the bite, the area has become somewhat painful with and without palpation and "more red".    Additionally of note, patient was seen in the [facility] ED on 9/28/2020 with similar symptoms.  Patient was discharged on Ceftin, though as her Blood Cultures returned positive for gram negative rods, patient was contacted and asked to return to [facility] ED for further evaluation.    T(C): 37.1 (09-29-20 @ 23:46), Max: 39.4 (09-29-20 @ 00:04)  T(F): 98.8 (09-29-20 @ 23:46), Max: 103 (09-29-20 @ 00:04)  HR: 62 (09-29-20 @ 23:46) (62 - 80)  BP: 129/66 (09-29-20 @ 23:46) (110/62 - 159/75)  RR: 18 (09-29-20 @ 23:46) (16 - 22)  SpO2: 95% (09-29-20 @ 23:46) (94% - 96%)  Wt(kg): --    P/E: As above MAR    A/P:    Acute CHF Exacerbation  -HEART Score= 3 (Age >=65, 1-2 Risk Factors), Low Score, Risk of MACE of 0.9-1.7%.  -STEPHANIE Score= 2 (Age >=65, Severe Angina >=2 episodes in 24 hours), 8% risk at 14 days of: all-cause mortality, new or recurrent MI, or severe recurrent ischemia requiring urgent revascularization.    -Pro-BNP= 2,408 (no baseline available)  -Will admit patient to Telemetry for continuous cardiac monitoring  -TTE  -Will continue to trend troponins with simultaneous acquisition of EKG  -Gentle diuresis  -Strict I&O  -Heart Healthy diet  -Fluid Restrictions  -Vital Signs Q4H  -Patient's  (passed away 4 years ago) was previously managing her "Heart murmur".  Therefore, should ask Cardiology to evaluate patient    Cellulitis of Right Lower Extremity:  -ALT-70 Score= 6 (Asymmetric, Age >=70, WBC in ED >=10,000), >82.2% likelihood of true cellulitis    -Patient's blood cultures (from date 9/28/2020) identified gram negative rods, though as patient has no s/sx of sepsis, but has a possible source of infection being from a dog bite approximately 1 week prior to presentation resulting in an infection with pasteurella aeruginosa (as well as with a variety of other potential microorganisms [including, but not limited to Pasteurella multocida and canis, Staphylococcus and Streptococcus species, Capnocytophaga canimorsus as well as other Gram-negative bacteria], will empirically treat with Augmentin 875/125mg BID, for now.  -Additionally, should obtain medical records from PCP of vaccination history (to verify Rabies vaccination history).  -Aside from recent UTI, as there are no other apparently obvious source(s) of infection at this time, will therefore order CT Abdomen/Pelvis without IV contrast    -Should send Procalcitonin, ESR, CRP  -Tylenol PRN for fever  -Should ask Infectious Disease (ID) to evaluate patient for further recommendations to narrow down antibiotic regiment    Transaminitis:  -Ultrasound Abdomen identified Hepatic steatosis.  No hydronephrosis or urolithiasis.  -Will continue to monitor with AM labs and if necessary, should consider consulting Gastroenterology, though not necessary at this time.    Renal Cyst:  -Ultrasound Abdomen identified of the right kidney: 10.4 cm. 5 mm sized cortical cyst. Slightly prominent right renal pelvis.  -Patient should follow-up with Urology as an outpatient    HTN:  -May continue patient's home medication regiment  -Vital Signs Q4H    GI/DVT PPx:  -Heparin  -Pepcid Patient is a 89 year old female with a PMH of "Heart Murmur", umbilical hernia, HTN who presented with a chief complaint of chest pain.  (British  ID: [id number]).  Patient states that over the course of the past 24 hours prior to presentation, she has been experiencing intermittent, midsternal chest and epigastric discomfort.  In addition, patient endorses nausea along with 3 episodes of non-bloody emesis, subjective fever/chills as well generalized weakness.  Of note, patient states that for the past 50 years, when she moves a certain way, she experiences pain in the right upper quadrant of her abdomen.    Patient endorses that approximately one week prior to presentation (though daughter, who was at bedside states it may be up to three weeks ago), patient was bitten by a dog on the anterior aspect of her right lower extremity.  Since the bite, the area has become somewhat painful with and without palpation and "more red".    Additionally of note, patient was seen in the [facility] ED on 9/28/2020 with similar symptoms.  Patient was discharged on Ceftin, though as her Blood Cultures returned positive for gram negative rods, patient was contacted and asked to return to [facility] ED for further evaluation.    T(C): 37.1 (09-29-20 @ 23:46), Max: 39.4 (09-29-20 @ 00:04)  T(F): 98.8 (09-29-20 @ 23:46), Max: 103 (09-29-20 @ 00:04)  HR: 62 (09-29-20 @ 23:46) (62 - 80)  BP: 129/66 (09-29-20 @ 23:46) (110/62 - 159/75)  RR: 18 (09-29-20 @ 23:46) (16 - 22)  SpO2: 95% (09-29-20 @ 23:46) (94% - 96%)  Wt(kg): --    P/E: As above MAR    A/P:    Acute CHF Exacerbation  -HEART Score= 3 (Age >=65, 1-2 Risk Factors), Low Score, Risk of MACE of 0.9-1.7%.  -STEPHANIE Score= 2 (Age >=65, Severe Angina >=2 episodes in 24 hours), 8% risk at 14 days of: all-cause mortality, new or recurrent MI, or severe recurrent ischemia requiring urgent revascularization.    -Pro-BNP= 2,408 (no baseline available)  -Will admit patient to Telemetry for continuous cardiac monitoring  -TTE  -Will continue to trend troponins with simultaneous acquisition of EKG  -Gentle diuresis  -Strict I&O  -Heart Healthy diet  -Fluid Restrictions  -Vital Signs Q4H  -Patient's  (passed away 4 years ago) was previously managing her "Heart murmur".  Therefore, should ask Cardiology to evaluate patient    Cellulitis of Right Lower Extremity:  -ALT-70 Score= 6 (Asymmetric, Age >=70, WBC in ED >=10,000), >82.2% likelihood of true cellulitis    -Patient's blood cultures (from date 9/28/2020) identified gram negative rods, though as patient has no s/sx of sepsis, but has a possible source of infection being from a dog bite approximately 1 week prior to presentation resulting in an infection with pasteurella aeruginosa (as well as with a variety of other potential microorganisms [including, but not limited to Pasteurella multocida and canis, Staphylococcus and Streptococcus species, Capnocytophaga canimorsus as well as other Gram-negative bacteria], will empirically treat with Augmentin 875/125mg BID, for now.  -Additionally, should obtain medical records from PCP of vaccination history (to verify Rabies vaccination history).  -Aside from recent UTI, as there are no other apparently obvious source(s) of infection at this time, will therefore order CT Abdomen/Pelvis without IV contrast    -Should send Procalcitonin, ESR, CRP  -Tylenol PRN for fever  -Should ask Infectious Disease (ID) to evaluate patient for further recommendations to narrow down antibiotic regiment    Transaminitis:  -Ultrasound Abdomen identified Hepatic steatosis.  No hydronephrosis or urolithiasis.  -Will continue to monitor with AM labs and if necessary, should consider consulting Gastroenterology, though not necessary at this time.    Renal Cyst:  -Ultrasound Abdomen identified of the right kidney: 10.4 cm. 5 mm sized cortical cyst. Slightly prominent right renal pelvis.  -Patient should follow-up with Urology as an outpatient    HTN:  -May continue patient's home medication regiment  -Vital Signs Q4H    GI/DVT PPx:  -Heparin  -Pepcid

## 2020-09-29 NOTE — ED ADULT NURSE NOTE - OBJECTIVE STATEMENT
Pt BIBA with daughter c/o of chest pain and abdominal pain. Pt denies fever at home. Denies any sick contact. Skin intact and steady gait

## 2020-09-29 NOTE — ED ADULT NURSE NOTE - NSIMPLEMENTINTERV_GEN_ALL_ED
Implemented All Fall with Harm Risk Interventions:  [place] to call system. Call bell, personal items and telephone within reach. Instruct patient to call for assistance. Room bathroom lighting operational. Non-slip footwear when patient is off stretcher. Physically safe environment: no spills, clutter or unnecessary equipment. Stretcher in lowest position, wheels locked, appropriate side rails in place. Provide visual cue, wrist band, yellow gown, etc. Monitor gait and stability. Monitor for mental status changes and reorient to person, place, and time. Review medications for side effects contributing to fall risk. Reinforce activity limits and safety measures with patient and family. Provide visual clues: red socks.

## 2020-09-29 NOTE — ED ADULT NURSE NOTE - OBJECTIVE STATEMENT
As per daughter pt was here yesterday with compliant of abd pain , chest pain, fever nausea and vomiting was seen in ED discharged and called to come back to the hospital by Dr Garcia because they found bacteremia in her blood. As per daughter pt was here yesterday with compliant of abd pain , chest pain, fever nausea and vomiting was seen in ED discharged and called to come back to the hospital by Dr Garcia because they found bacteremia in her blood. Abrasion noted on the right lower extremity. No sign of acute distress noted.

## 2020-09-29 NOTE — H&P ADULT - HISTORY OF PRESENT ILLNESS
89 year old Nigerian speaking female AAO X 3 , lives with daughter presented to ED for epigastric /  mid sternal pain. Patient had 3 episodes of NBNB vomiting yesterday . Patient came to ED yesterday for nausea , vomiting fever and epigastric pain , her UA . blood and urine cultures were sent she got Rocephin last night and she started feeling better and asked ED for discharge , she was discharged on Ceftin . Her blood cultures grew gram negative rods and she was called back to hospital. currentl patient complains of mid sternal pain and some time RUQ pain when she wakes up, as per patient her RUQ pain is chronic in nature and is been there for many years. dDaughter at bed side also added that she is getting moer short of breath these days. Patient denies head ache, dizziness, numbness, tingling, palpitaions, bowel or urinary prblems.          GOC: DNR/DNI, Molst form placed in chart.

## 2020-09-29 NOTE — H&P ADULT - PROBLEM SELECTOR PLAN 6
RISK                                                          Points  [] Previous VTE                                           3  [] Thrombophilia                                        2  [] Lower limb paralysis                              2   [] Current Cancer                                       2   [x] Immobilization > 24 hrs                        1  [] ICU/CCU stay > 24 hours                       1  [x] Age > 60                                                   1    Lovenoc for dvt ppx

## 2020-09-29 NOTE — ED PROVIDER NOTE - PATIENT PORTAL LINK FT
You can access the FollowMyHealth Patient Portal offered by [facility] by registering at the following website: [email or web address]. By joining [employer]’s FollowMyHealth portal, you will also be able to view your health information using other applications (apps) compatible with our system.

## 2020-09-30 LAB
A1C WITH ESTIMATED AVERAGE GLUCOSE RESULT: 5.8 % — HIGH (ref 4–5.6)
ALBUMIN SERPL ELPH-MCNC: 2.8 G/DL — LOW (ref 3.5–5)
ALP SERPL-CCNC: 168 U/L — HIGH (ref 40–120)
ALT FLD-CCNC: 157 U/L DA — HIGH (ref 10–60)
ANION GAP SERPL CALC-SCNC: 9 MMOL/L — SIGNIFICANT CHANGE UP (ref 5–17)
AST SERPL-CCNC: 98 U/L — HIGH (ref 10–40)
BASOPHILS # BLD AUTO: 0.03 K/UL — SIGNIFICANT CHANGE UP (ref 0–0.2)
BASOPHILS NFR BLD AUTO: 0.3 % — SIGNIFICANT CHANGE UP (ref 0–2)
BILIRUB DIRECT SERPL-MCNC: 0.4 MG/DL — HIGH (ref 0–0.2)
BILIRUB INDIRECT FLD-MCNC: 1.3 MG/DL — HIGH (ref 0.2–1)
BILIRUB SERPL-MCNC: 1.7 MG/DL — HIGH (ref 0.2–1.2)
BILIRUB SERPL-MCNC: 1.8 MG/DL — HIGH (ref 0.2–1.2)
BUN SERPL-MCNC: 13 MG/DL — SIGNIFICANT CHANGE UP (ref 7–18)
CALCIUM SERPL-MCNC: 8.4 MG/DL — SIGNIFICANT CHANGE UP (ref 8.4–10.5)
CHLORIDE SERPL-SCNC: 104 MMOL/L — SIGNIFICANT CHANGE UP (ref 96–108)
CHOLEST SERPL-MCNC: 214 MG/DL — HIGH (ref 10–199)
CK MB BLD-MCNC: 1.4 % — SIGNIFICANT CHANGE UP (ref 0–3.5)
CK MB CFR SERPL CALC: 1.7 NG/ML — SIGNIFICANT CHANGE UP (ref 0–3.6)
CK SERPL-CCNC: 118 U/L — SIGNIFICANT CHANGE UP (ref 21–215)
CO2 SERPL-SCNC: 23 MMOL/L — SIGNIFICANT CHANGE UP (ref 22–31)
CREAT SERPL-MCNC: 1 MG/DL — SIGNIFICANT CHANGE UP (ref 0.5–1.3)
CULTURE RESULTS: NO GROWTH — SIGNIFICANT CHANGE UP
CULTURE RESULTS: SIGNIFICANT CHANGE UP
EOSINOPHIL # BLD AUTO: 0.04 K/UL — SIGNIFICANT CHANGE UP (ref 0–0.5)
EOSINOPHIL NFR BLD AUTO: 0.4 % — SIGNIFICANT CHANGE UP (ref 0–6)
ESTIMATED AVERAGE GLUCOSE: 120 MG/DL — HIGH (ref 68–114)
FOLATE SERPL-MCNC: 4.9 NG/ML — SIGNIFICANT CHANGE UP
GLUCOSE SERPL-MCNC: 105 MG/DL — HIGH (ref 70–99)
HAV IGM SER-ACNC: SIGNIFICANT CHANGE UP
HBV CORE IGM SER-ACNC: SIGNIFICANT CHANGE UP
HBV SURFACE AG SER-ACNC: SIGNIFICANT CHANGE UP
HCT VFR BLD CALC: 34 % — LOW (ref 34.5–45)
HCV AB S/CO SERPL IA: 0.07 S/CO — SIGNIFICANT CHANGE UP (ref 0–0.99)
HCV AB SERPL-IMP: SIGNIFICANT CHANGE UP
HDLC SERPL-MCNC: 66 MG/DL — SIGNIFICANT CHANGE UP
HGB BLD-MCNC: 11.4 G/DL — LOW (ref 11.5–15.5)
IMM GRANULOCYTES NFR BLD AUTO: 0.3 % — SIGNIFICANT CHANGE UP (ref 0–1.5)
LIPID PNL WITH DIRECT LDL SERPL: 126 MG/DL — SIGNIFICANT CHANGE UP
LYMPHOCYTES # BLD AUTO: 1.2 K/UL — SIGNIFICANT CHANGE UP (ref 1–3.3)
LYMPHOCYTES # BLD AUTO: 12.4 % — LOW (ref 13–44)
MAGNESIUM SERPL-MCNC: 2.2 MG/DL — SIGNIFICANT CHANGE UP (ref 1.6–2.6)
MCHC RBC-ENTMCNC: 27.1 PG — SIGNIFICANT CHANGE UP (ref 27–34)
MCHC RBC-ENTMCNC: 33.5 GM/DL — SIGNIFICANT CHANGE UP (ref 32–36)
MCV RBC AUTO: 80.8 FL — SIGNIFICANT CHANGE UP (ref 80–100)
MONOCYTES # BLD AUTO: 1.01 K/UL — HIGH (ref 0–0.9)
MONOCYTES NFR BLD AUTO: 10.5 % — SIGNIFICANT CHANGE UP (ref 2–14)
NEUTROPHILS # BLD AUTO: 7.34 K/UL — SIGNIFICANT CHANGE UP (ref 1.8–7.4)
NEUTROPHILS NFR BLD AUTO: 76.1 % — SIGNIFICANT CHANGE UP (ref 43–77)
NRBC # BLD: 0 /100 WBCS — SIGNIFICANT CHANGE UP (ref 0–0)
PHOSPHATE SERPL-MCNC: 2.8 MG/DL — SIGNIFICANT CHANGE UP (ref 2.5–4.5)
PLATELET # BLD AUTO: 183 K/UL — SIGNIFICANT CHANGE UP (ref 150–400)
POTASSIUM SERPL-MCNC: 3.9 MMOL/L — SIGNIFICANT CHANGE UP (ref 3.5–5.3)
POTASSIUM SERPL-SCNC: 3.9 MMOL/L — SIGNIFICANT CHANGE UP (ref 3.5–5.3)
PROCALCITONIN SERPL-MCNC: 2.06 NG/ML — HIGH (ref 0.02–0.1)
PROT SERPL-MCNC: 6.1 G/DL — SIGNIFICANT CHANGE UP (ref 6–8.3)
RBC # BLD: 4.21 M/UL — SIGNIFICANT CHANGE UP (ref 3.8–5.2)
RBC # FLD: 13.4 % — SIGNIFICANT CHANGE UP (ref 10.3–14.5)
SARS-COV-2 IGG SERPL QL IA: NEGATIVE — SIGNIFICANT CHANGE UP
SARS-COV-2 IGM SERPL IA-ACNC: <3.8 AU/ML — SIGNIFICANT CHANGE UP
SODIUM SERPL-SCNC: 136 MMOL/L — SIGNIFICANT CHANGE UP (ref 135–145)
SPECIMEN SOURCE: SIGNIFICANT CHANGE UP
SPECIMEN SOURCE: SIGNIFICANT CHANGE UP
TOTAL CHOLESTEROL/HDL RATIO MEASUREMENT: 3.2 RATIO — LOW (ref 3.3–7.1)
TRIGL SERPL-MCNC: 108 MG/DL — SIGNIFICANT CHANGE UP (ref 10–149)
TROPONIN I SERPL-MCNC: <0.015 NG/ML — SIGNIFICANT CHANGE UP (ref 0–0.04)
TROPONIN I SERPL-MCNC: <0.015 NG/ML — SIGNIFICANT CHANGE UP (ref 0–0.04)
TSH SERPL-MCNC: 0.73 UU/ML — SIGNIFICANT CHANGE UP (ref 0.34–4.82)
VIT B12 SERPL-MCNC: 420 PG/ML — SIGNIFICANT CHANGE UP (ref 232–1245)
WBC # BLD: 9.65 K/UL — SIGNIFICANT CHANGE UP (ref 3.8–10.5)
WBC # FLD AUTO: 9.65 K/UL — SIGNIFICANT CHANGE UP (ref 3.8–10.5)

## 2020-09-30 PROCEDURE — 99223 1ST HOSP IP/OBS HIGH 75: CPT

## 2020-09-30 PROCEDURE — 74176 CT ABD & PELVIS W/O CONTRAST: CPT | Mod: 26

## 2020-09-30 PROCEDURE — 99233 SBSQ HOSP IP/OBS HIGH 50: CPT | Mod: GC

## 2020-09-30 RX ORDER — AMLODIPINE BESYLATE 2.5 MG/1
5 TABLET ORAL DAILY
Refills: 0 | Status: DISCONTINUED | OUTPATIENT
Start: 2020-09-30 | End: 2020-10-02

## 2020-09-30 RX ORDER — RAMIPRIL 5 MG
1 CAPSULE ORAL
Qty: 0 | Refills: 0 | DISCHARGE

## 2020-09-30 RX ORDER — TEMAZEPAM 15 MG/1
7.5 CAPSULE ORAL AT BEDTIME
Refills: 0 | Status: DISCONTINUED | OUTPATIENT
Start: 2020-09-30 | End: 2020-10-02

## 2020-09-30 RX ORDER — METRONIDAZOLE 500 MG
TABLET ORAL
Refills: 0 | Status: DISCONTINUED | OUTPATIENT
Start: 2020-09-30 | End: 2020-10-02

## 2020-09-30 RX ORDER — LISINOPRIL 2.5 MG/1
40 TABLET ORAL DAILY
Refills: 0 | Status: DISCONTINUED | OUTPATIENT
Start: 2020-09-30 | End: 2020-10-02

## 2020-09-30 RX ORDER — FUROSEMIDE 40 MG
40 TABLET ORAL DAILY
Refills: 0 | Status: DISCONTINUED | OUTPATIENT
Start: 2020-09-30 | End: 2020-09-30

## 2020-09-30 RX ORDER — CEFTRIAXONE 500 MG/1
1000 INJECTION, POWDER, FOR SOLUTION INTRAMUSCULAR; INTRAVENOUS EVERY 24 HOURS
Refills: 0 | Status: DISCONTINUED | OUTPATIENT
Start: 2020-09-30 | End: 2020-10-02

## 2020-09-30 RX ORDER — TEMAZEPAM 15 MG/1
1 CAPSULE ORAL
Qty: 0 | Refills: 0 | DISCHARGE

## 2020-09-30 RX ORDER — LISINOPRIL 2.5 MG/1
40 TABLET ORAL ONCE
Refills: 0 | Status: COMPLETED | OUTPATIENT
Start: 2020-09-30 | End: 2020-09-30

## 2020-09-30 RX ORDER — FUROSEMIDE 40 MG
40 TABLET ORAL DAILY
Refills: 0 | Status: DISCONTINUED | OUTPATIENT
Start: 2020-09-30 | End: 2020-10-02

## 2020-09-30 RX ORDER — PANTOPRAZOLE SODIUM 20 MG/1
40 TABLET, DELAYED RELEASE ORAL
Refills: 0 | Status: DISCONTINUED | OUTPATIENT
Start: 2020-09-30 | End: 2020-10-02

## 2020-09-30 RX ORDER — METRONIDAZOLE 500 MG
500 TABLET ORAL EVERY 8 HOURS
Refills: 0 | Status: DISCONTINUED | OUTPATIENT
Start: 2020-10-01 | End: 2020-10-02

## 2020-09-30 RX ORDER — BISOPROLOL FUMARATE 10 MG/1
1 TABLET, FILM COATED ORAL
Qty: 0 | Refills: 0 | DISCHARGE

## 2020-09-30 RX ORDER — ESCITALOPRAM OXALATE 10 MG/1
1 TABLET, FILM COATED ORAL
Qty: 0 | Refills: 0 | DISCHARGE

## 2020-09-30 RX ORDER — METOPROLOL TARTRATE 50 MG
25 TABLET ORAL EVERY 12 HOURS
Refills: 0 | Status: DISCONTINUED | OUTPATIENT
Start: 2020-09-30 | End: 2020-10-02

## 2020-09-30 RX ORDER — AMLODIPINE BESYLATE 2.5 MG/1
1 TABLET ORAL
Qty: 0 | Refills: 0 | DISCHARGE

## 2020-09-30 RX ORDER — INFLUENZA VIRUS VACCINE 15; 15; 15; 15 UG/.5ML; UG/.5ML; UG/.5ML; UG/.5ML
0.5 SUSPENSION INTRAMUSCULAR ONCE
Refills: 0 | Status: DISCONTINUED | OUTPATIENT
Start: 2020-09-30 | End: 2020-10-02

## 2020-09-30 RX ORDER — METRONIDAZOLE 500 MG
500 TABLET ORAL ONCE
Refills: 0 | Status: COMPLETED | OUTPATIENT
Start: 2020-09-30 | End: 2020-09-30

## 2020-09-30 RX ORDER — ESCITALOPRAM OXALATE 10 MG/1
10 TABLET, FILM COATED ORAL DAILY
Refills: 0 | Status: DISCONTINUED | OUTPATIENT
Start: 2020-09-30 | End: 2020-10-02

## 2020-09-30 RX ORDER — ACETAMINOPHEN 500 MG
650 TABLET ORAL EVERY 6 HOURS
Refills: 0 | Status: DISCONTINUED | OUTPATIENT
Start: 2020-09-30 | End: 2020-10-02

## 2020-09-30 RX ADMIN — Medication 25 MILLIGRAM(S): at 22:11

## 2020-09-30 RX ADMIN — ESCITALOPRAM OXALATE 10 MILLIGRAM(S): 10 TABLET, FILM COATED ORAL at 11:51

## 2020-09-30 RX ADMIN — Medication 5 MILLIGRAM(S): at 01:08

## 2020-09-30 RX ADMIN — AMLODIPINE BESYLATE 5 MILLIGRAM(S): 2.5 TABLET ORAL at 05:47

## 2020-09-30 RX ADMIN — Medication 1 TABLET(S): at 05:47

## 2020-09-30 RX ADMIN — Medication 100 MILLIGRAM(S): at 22:10

## 2020-09-30 RX ADMIN — CEFTRIAXONE 100 MILLIGRAM(S): 500 INJECTION, POWDER, FOR SOLUTION INTRAMUSCULAR; INTRAVENOUS at 10:14

## 2020-09-30 RX ADMIN — ENOXAPARIN SODIUM 40 MILLIGRAM(S): 100 INJECTION SUBCUTANEOUS at 11:51

## 2020-09-30 RX ADMIN — LISINOPRIL 40 MILLIGRAM(S): 2.5 TABLET ORAL at 02:06

## 2020-09-30 RX ADMIN — LISINOPRIL 40 MILLIGRAM(S): 2.5 TABLET ORAL at 05:48

## 2020-09-30 NOTE — CONSULT NOTE ADULT - SUBJECTIVE AND OBJECTIVE BOX
CHIEF COMPLAINT: chest pain    HPI: 89 yo Ghanaian-speaking F with HTN who presented with chest pain. Patient initially presented ot the ER with fevers; she was discharged then called back in when her blood cultures came back positive for GNRs.     PAST MEDICAL & SURGICAL HISTORY:  As above, also anxiety  S/P hip replacement    Allergies    No Known Allergies    MEDICATIONS  (STANDING):  amLODIPine   Tablet 5 milliGRAM(s) Oral daily  cefTRIAXone   IVPB 1000 milliGRAM(s) IV Intermittent every 24 hours  enoxaparin Injectable 40 milliGRAM(s) SubCutaneous daily  escitalopram 10 milliGRAM(s) Oral daily  furosemide    Tablet 40 milliGRAM(s) Oral daily  influenza   Vaccine 0.5 milliLiter(s) IntraMuscular once  lisinopril 40 milliGRAM(s) Oral daily  melatonin 5 milliGRAM(s) Oral at bedtime  pantoprazole    Tablet 40 milliGRAM(s) Oral before breakfast    MEDICATIONS  (PRN):  acetaminophen   Tablet .. 650 milliGRAM(s) Oral every 6 hours PRN Temp greater or equal to 38C (100.4F)  temazepam 7.5 milliGRAM(s) Oral at bedtime PRN Insomnia      FAMILY HISTORY:    No family history of premature coronary artery disease or sudden cardiac death    SOCIAL HISTORY:  Smoking-  Alcohol-  Illicit Drug use-    REVIEW OF SYSTEMS:  Constitutional: [ ] fever, [ ]weight loss,  [ ]fatigue  Eyes: [ ] visual changes  Respiratory: [ ]shortness of breath;  [ ] cough, [ ]wheezing, [ ]chills, [ ]hemoptysis  Cardiovascular: [ ] chest pain, [ ]palpitations, [ ]dizziness,  [ ]leg swelling [ ]syncope  Gastrointestinal: [ ] abdominal pain, [ ]nausea, [ ]vomiting,  [ ]diarrhea   Genitourinary: [ ] dysuria, [ ] hematuria  Neurologic: [ ] headaches [ ] tremors  [ ] weakness [ ] lightheadedness  Skin: [ ] itching, [ ]burning, [ ] rashes  Endocrine: [ ] heat or cold intolerance  Musculoskeletal: [ ] joint pain or swelling; [ ] muscle, back, or extremity pain  Psychiatric: [ ] depression, [ ]anxiety, [ ]mood swings, or [ ]difficulty sleeping  Hematologic: [ ] easy bruising, [ ] bleeding gums       [ x] All others negative	  [ ] Unable to obtain    Vital Signs Last 24 Hrs  T(C): 37.6 (30 Sep 2020 05:29), Max: 37.6 (30 Sep 2020 05:29)  T(F): 99.6 (30 Sep 2020 05:29), Max: 99.6 (30 Sep 2020 05:29)  HR: 70 (30 Sep 2020 05:29) (62 - 81)  BP: 129/45 (30 Sep 2020 05:29) (129/45 - 188/62)  BP(mean): --  RR: 17 (30 Sep 2020 05:29) (17 - 18)  SpO2: 95% (30 Sep 2020 05:29) (95% - 96%)  I&O's Summary      PHYSICAL EXAM:  General: No acute distress  HEENT: EOMI, PERRL  Neck: Supple, No JVD  Lungs: Clear to auscultation bilaterally; No rales or wheezing  Heart: Regular rate and rhythm; No murmurs, rubs, or gallops  Abdomen: Nontender, bowel sounds present  Extremities: No clubbing, cyanosis, or edema  Nervous system:  Alert & Oriented X3, no focal deficits  Psychiatric: Normal affect  Skin: No rashes or lesions      LABS:  09-30    136  |  104  |  13  ----------------------------<  105<H>  3.9   |  23  |  1.00    Ca    8.4      30 Sep 2020 06:09  Phos  2.8     09-30  Mg     2.2     09-30    TPro  6.1  /  Alb  2.8<L>  /  TBili  1.8<H>  /  DBili  x   /  AST  98<H>  /  ALT  157<H>  /  AlkPhos  168<H>  09-30    Creatinine Trend: 1.00<--, 1.06<--, 1.04<--                        11.4   9.65  )-----------( 183      ( 30 Sep 2020 06:09 )             34.0     PT/INR - ( 29 Sep 2020 00:44 )   PT: 13.1 sec;   INR: 1.13 ratio         PTT - ( 29 Sep 2020 00:44 )  PTT:30.6 sec    Lipid Panel: Cholesterol, Serum 214  Direct   HDL Cholesterol, Serum 66  Triglycerides, Serum 108    Cardiac Enzymes: CARDIAC MARKERS ( 30 Sep 2020 06:09 )  <0.015 ng/mL / x     / x     / x     / x      CARDIAC MARKERS ( 29 Sep 2020 03:45 )  <0.015 ng/mL / x     / x     / x     / x      CARDIAC MARKERS ( 29 Sep 2020 00:44 )  <0.015 ng/mL / x     / x     / x     / x          Serum Pro-Brain Natriuretic Peptide: 2408 pg/mL (09-29-20 @ 19:29)        RADIOLOGY:    ECG [my interpretation]:    TELEMETRY:    ECHO:    STRESS TEST:    CATHETERIZATION: CHIEF COMPLAINT: chest pain    HPI: 89 yo Greenlandic-speaking F with HTN who presented with chest pain. Patient initially presented ot the ER with fevers; she was discharged then called back in when her blood cultures came back positive for GNRs. Patient reports    PAST MEDICAL & SURGICAL HISTORY:  As above, also anxiety  S/P hip replacement    Allergies    No Known Allergies    MEDICATIONS  (STANDING):  amLODIPine   Tablet 5 milliGRAM(s) Oral daily  cefTRIAXone   IVPB 1000 milliGRAM(s) IV Intermittent every 24 hours  enoxaparin Injectable 40 milliGRAM(s) SubCutaneous daily  escitalopram 10 milliGRAM(s) Oral daily  furosemide    Tablet 40 milliGRAM(s) Oral daily  influenza   Vaccine 0.5 milliLiter(s) IntraMuscular once  lisinopril 40 milliGRAM(s) Oral daily  melatonin 5 milliGRAM(s) Oral at bedtime  pantoprazole    Tablet 40 milliGRAM(s) Oral before breakfast    MEDICATIONS  (PRN):  acetaminophen   Tablet .. 650 milliGRAM(s) Oral every 6 hours PRN Temp greater or equal to 38C (100.4F)  temazepam 7.5 milliGRAM(s) Oral at bedtime PRN Insomnia      FAMILY HISTORY:    No family history of premature coronary artery disease or sudden cardiac death    SOCIAL HISTORY:  Smoking-  Alcohol-  Illicit Drug use-    REVIEW OF SYSTEMS:  Constitutional: [ ] fever, [ ]weight loss,  [ ]fatigue  Eyes: [ ] visual changes  Respiratory: [ ]shortness of breath;  [ ] cough, [ ]wheezing, [ ]chills, [ ]hemoptysis  Cardiovascular: [ ] chest pain, [ ]palpitations, [ ]dizziness,  [ ]leg swelling [ ]syncope  Gastrointestinal: [ ] abdominal pain, [ ]nausea, [ ]vomiting,  [ ]diarrhea   Genitourinary: [ ] dysuria, [ ] hematuria  Neurologic: [ ] headaches [ ] tremors  [ ] weakness [ ] lightheadedness  Skin: [ ] itching, [ ]burning, [ ] rashes  Endocrine: [ ] heat or cold intolerance  Musculoskeletal: [ ] joint pain or swelling; [ ] muscle, back, or extremity pain  Psychiatric: [ ] depression, [ ]anxiety, [ ]mood swings, or [ ]difficulty sleeping  Hematologic: [ ] easy bruising, [ ] bleeding gums       [ x] All others negative	  [ ] Unable to obtain    Vital Signs Last 24 Hrs  T(C): 37.6 (30 Sep 2020 05:29), Max: 37.6 (30 Sep 2020 05:29)  T(F): 99.6 (30 Sep 2020 05:29), Max: 99.6 (30 Sep 2020 05:29)  HR: 70 (30 Sep 2020 05:29) (62 - 81)  BP: 129/45 (30 Sep 2020 05:29) (129/45 - 188/62)  BP(mean): --  RR: 17 (30 Sep 2020 05:29) (17 - 18)  SpO2: 95% (30 Sep 2020 05:29) (95% - 96%)  I&O's Summary      PHYSICAL EXAM:  General: No acute distress  HEENT: EOMI, PERRL  Neck: Supple, No JVD  Lungs: Clear to auscultation bilaterally; No rales or wheezing  Heart: Regular rate and rhythm; No murmurs, rubs, or gallops  Abdomen: Nontender, bowel sounds present  Extremities: No clubbing, cyanosis, or edema  Nervous system:  Alert & Oriented X3, no focal deficits  Psychiatric: Normal affect  Skin: No rashes or lesions      LABS:  09-30    136  |  104  |  13  ----------------------------<  105<H>  3.9   |  23  |  1.00    Ca    8.4      30 Sep 2020 06:09  Phos  2.8     09-30  Mg     2.2     09-30    TPro  6.1  /  Alb  2.8<L>  /  TBili  1.8<H>  /  DBili  x   /  AST  98<H>  /  ALT  157<H>  /  AlkPhos  168<H>  09-30    Creatinine Trend: 1.00<--, 1.06<--, 1.04<--                        11.4   9.65  )-----------( 183      ( 30 Sep 2020 06:09 )             34.0     PT/INR - ( 29 Sep 2020 00:44 )   PT: 13.1 sec;   INR: 1.13 ratio         PTT - ( 29 Sep 2020 00:44 )  PTT:30.6 sec    Lipid Panel: Cholesterol, Serum 214  Direct   HDL Cholesterol, Serum 66  Triglycerides, Serum 108    Cardiac Enzymes: CARDIAC MARKERS ( 30 Sep 2020 06:09 )  <0.015 ng/mL / x     / x     / x     / x      CARDIAC MARKERS ( 29 Sep 2020 03:45 )  <0.015 ng/mL / x     / x     / x     / x      CARDIAC MARKERS ( 29 Sep 2020 00:44 )  <0.015 ng/mL / x     / x     / x     / x        Serum Pro-Brain Natriuretic Peptide: 2408 pg/mL (09-29-20 @ 19:29)    RADIOLOGY:   < from: Xray Chest 1 View-PORTABLE IMMEDIATE (Xray Chest 1 View-PORTABLE IMMEDIATE .) (09.29.20 @ 20:50) >  IMPRESSION:    Mild bibasilar discoid atelectasis.    < end of copied text >    ECG [my interpretation]: 9/29/2020 @ 19:11: Sinus rhythm, normal axis, normal EKG    TELEMETRY:     CHIEF COMPLAINT: chest pain    HPI: 89 yo Gibraltarian-speaking F with HTN who presented with chest pain. Patient initially presented to the ER with fevers; she was discharged then called back in when her blood cultures came back positive for GNRs. Patient reports the chest pain she has is midsternal, not radiating, feels like "muscle soreness" and has been present for many years. It occurs less than 1/month, is not associated with dyspnea or LH. Currently patient denies any chest pain, dyspnea, palpitations, lightheadedness, or syncope. Patient denies LE edema, orthopnea, or PND.    PAST MEDICAL & SURGICAL HISTORY:  As above, also anxiety  S/P hip replacement    Allergies    No Known Allergies    MEDICATIONS  (STANDING):  amLODIPine   Tablet 5 milliGRAM(s) Oral daily  cefTRIAXone   IVPB 1000 milliGRAM(s) IV Intermittent every 24 hours  enoxaparin Injectable 40 milliGRAM(s) SubCutaneous daily  escitalopram 10 milliGRAM(s) Oral daily  furosemide    Tablet 40 milliGRAM(s) Oral daily  influenza   Vaccine 0.5 milliLiter(s) IntraMuscular once  lisinopril 40 milliGRAM(s) Oral daily  melatonin 5 milliGRAM(s) Oral at bedtime  pantoprazole    Tablet 40 milliGRAM(s) Oral before breakfast    MEDICATIONS  (PRN):  acetaminophen   Tablet .. 650 milliGRAM(s) Oral every 6 hours PRN Temp greater or equal to 38C (100.4F)  temazepam 7.5 milliGRAM(s) Oral at bedtime PRN Insomnia      FAMILY HISTORY:  Mother: Aortic aneurysm  No family history of premature coronary artery disease or sudden cardiac death    SOCIAL HISTORY:  Smoking-Denies  Alcohol-Denies  Illicit Drug use-Denies    REVIEW OF SYSTEMS:  Constitutional: [ ] fever, [ ]weight loss,  [ ]fatigue  Eyes: [ ] visual changes  Respiratory: [ ]shortness of breath;  [ ] cough, [ ]wheezing, [ ]chills, [ ]hemoptysis  Cardiovascular: [ ] chest pain, [ ]palpitations, [ ]dizziness,  [ ]leg swelling [ ]syncope  Gastrointestinal: [ ] abdominal pain, [ ]nausea, [ ]vomiting,  [ ]diarrhea   Genitourinary: [ ] dysuria, [ ] hematuria  Neurologic: [ ] headaches [ ] tremors  [ ] weakness [ ] lightheadedness  Skin: [ ] itching, [ ]burning, [ ] rashes  Endocrine: [ ] heat or cold intolerance  Musculoskeletal: [ ] joint pain or swelling; [ ] muscle, back, or extremity pain  Psychiatric: [ ] depression, [ ]anxiety, [ ]mood swings, or [ ]difficulty sleeping  Hematologic: [ ] easy bruising, [ ] bleeding gums       [ x] All others negative	  [ ] Unable to obtain    Vital Signs Last 24 Hrs  T(C): 37.6 (30 Sep 2020 05:29), Max: 37.6 (30 Sep 2020 05:29)  T(F): 99.6 (30 Sep 2020 05:29), Max: 99.6 (30 Sep 2020 05:29)  HR: 70 (30 Sep 2020 05:29) (62 - 81)  BP: 129/45 (30 Sep 2020 05:29) (129/45 - 188/62)  BP(mean): --  RR: 17 (30 Sep 2020 05:29) (17 - 18)  SpO2: 95% (30 Sep 2020 05:29) (95% - 96%)  I&O's Summary      PHYSICAL EXAM:  General: No acute distress  HEENT: EOMI, PERRL  Neck: Supple, No JVD  Lungs: Clear to auscultation bilaterally; No rales or wheezing  Heart: Regular rate and rhythm; III/VI HSM at [facility]  Abdomen: Nontender, bowel sounds present  Extremities: No clubbing, cyanosis, or edema  Nervous system:  Alert & Oriented X3, no focal deficits  Psychiatric: Normal affect  Skin: No rashes or lesions      LABS:  09-30    136  |  104  |  13  ----------------------------<  105<H>  3.9   |  23  |  1.00    Ca    8.4      30 Sep 2020 06:09  Phos  2.8     09-30  Mg     2.2     09-30    TPro  6.1  /  Alb  2.8<L>  /  TBili  1.8<H>  /  DBili  x   /  AST  98<H>  /  ALT  157<H>  /  AlkPhos  168<H>  09-30    Creatinine Trend: 1.00<--, 1.06<--, 1.04<--                        11.4   9.65  )-----------( 183      ( 30 Sep 2020 06:09 )             34.0     PT/INR - ( 29 Sep 2020 00:44 )   PT: 13.1 sec;   INR: 1.13 ratio         PTT - ( 29 Sep 2020 00:44 )  PTT:30.6 sec    Lipid Panel: Cholesterol, Serum 214  Direct   HDL Cholesterol, Serum 66  Triglycerides, Serum 108    Cardiac Enzymes: CARDIAC MARKERS ( 30 Sep 2020 06:09 )  <0.015 ng/mL / x     / x     / x     / x      CARDIAC MARKERS ( 29 Sep 2020 03:45 )  <0.015 ng/mL / x     / x     / x     / x      CARDIAC MARKERS ( 29 Sep 2020 00:44 )  <0.015 ng/mL / x     / x     / x     / x        Serum Pro-Brain Natriuretic Peptide: 2408 pg/mL (09-29-20 @ 19:29)    RADIOLOGY:   < from: Xray Chest 1 View-PORTABLE IMMEDIATE (Xray Chest 1 View-PORTABLE IMMEDIATE .) (09.29.20 @ 20:50) >  IMPRESSION:    Mild bibasilar discoid atelectasis.    < end of copied text >    ECG [my interpretation]: 9/29/2020 @ 19:11: Sinus rhythm, normal axis, normal EKG    TELEMETRY: n/a     CHIEF COMPLAINT: chest pain    HPI: 89 yo Maldivian-speaking F with HTN who presented with chest pain. Patient initially presented to the ER with fevers; she was discharged then called back in when her blood cultures came back positive for GNRs. Patient reports the chest pain she has is midsternal, not radiating, feels like "muscle soreness" and has been present for many years. It occurs less than 1/month, is not associated with dyspnea or LH. Currently patient denies any chest pain, dyspnea, palpitations, lightheadedness, or syncope. Patient denies LE edema, orthopnea, or PND.    PAST MEDICAL & SURGICAL HISTORY:  As above, also anxiety  S/P hip replacement    Allergies    No Known Allergies    MEDICATIONS  (STANDING):  amLODIPine   Tablet 5 milliGRAM(s) Oral daily  cefTRIAXone   IVPB 1000 milliGRAM(s) IV Intermittent every 24 hours  enoxaparin Injectable 40 milliGRAM(s) SubCutaneous daily  escitalopram 10 milliGRAM(s) Oral daily  furosemide    Tablet 40 milliGRAM(s) Oral daily  influenza   Vaccine 0.5 milliLiter(s) IntraMuscular once  lisinopril 40 milliGRAM(s) Oral daily  melatonin 5 milliGRAM(s) Oral at bedtime  pantoprazole    Tablet 40 milliGRAM(s) Oral before breakfast    MEDICATIONS  (PRN):  acetaminophen   Tablet .. 650 milliGRAM(s) Oral every 6 hours PRN Temp greater or equal to 38C (100.4F)  temazepam 7.5 milliGRAM(s) Oral at bedtime PRN Insomnia      FAMILY HISTORY:  Mother: Aortic aneurysm  No family history of premature coronary artery disease or sudden cardiac death    SOCIAL HISTORY:  Smoking-Denies  Alcohol-Denies  Illicit Drug use-Denies    REVIEW OF SYSTEMS:  Constitutional: [ ] fever, [ ]weight loss,  [ ]fatigue  Eyes: [ ] visual changes  Respiratory: [ ]shortness of breath;  [ ] cough, [ ]wheezing, [ ]chills, [ ]hemoptysis  Cardiovascular: [ ] chest pain, [ ]palpitations, [ ]dizziness,  [ ]leg swelling [ ]syncope  Gastrointestinal: [ ] abdominal pain, [ ]nausea, [ ]vomiting,  [ ]diarrhea   Genitourinary: [ ] dysuria, [ ] hematuria  Neurologic: [ ] headaches [ ] tremors  [ ] weakness [ ] lightheadedness  Skin: [ ] itching, [ ]burning, [ ] rashes  Endocrine: [ ] heat or cold intolerance  Musculoskeletal: [ ] joint pain or swelling; [ ] muscle, back, or extremity pain  Psychiatric: [ ] depression, [ ]anxiety, [ ]mood swings, or [ ]difficulty sleeping  Hematologic: [ ] easy bruising, [ ] bleeding gums       [ x] All others negative	  [ ] Unable to obtain    Vital Signs Last 24 Hrs  T(C): 37.6 (30 Sep 2020 05:29), Max: 37.6 (30 Sep 2020 05:29)  T(F): 99.6 (30 Sep 2020 05:29), Max: 99.6 (30 Sep 2020 05:29)  HR: 70 (30 Sep 2020 05:29) (62 - 81)  BP: 129/45 (30 Sep 2020 05:29) (129/45 - 188/62)  BP(mean): --  RR: 17 (30 Sep 2020 05:29) (17 - 18)  SpO2: 95% (30 Sep 2020 05:29) (95% - 96%)  I&O's Summary      PHYSICAL EXAM:  General: No acute distress  HEENT: EOMI, PERRL  Neck: Supple, No JVD  Lungs: Clear to auscultation bilaterally; No rales or wheezing  Heart: Regular rate and rhythm; III/VI HSM at [facility]  Abdomen: Nontender, bowel sounds present  Extremities: No clubbing, cyanosis, or edema  Nervous system:  Alert & Oriented X3, no focal deficits  Psychiatric: Normal affect  Skin: RLE ulcerated lesion      LABS:  09-30    136  |  104  |  13  ----------------------------<  105<H>  3.9   |  23  |  1.00    Ca    8.4      30 Sep 2020 06:09  Phos  2.8     09-30  Mg     2.2     09-30    TPro  6.1  /  Alb  2.8<L>  /  TBili  1.8<H>  /  DBili  x   /  AST  98<H>  /  ALT  157<H>  /  AlkPhos  168<H>  09-30    Creatinine Trend: 1.00<--, 1.06<--, 1.04<--                        11.4   9.65  )-----------( 183      ( 30 Sep 2020 06:09 )             34.0     PT/INR - ( 29 Sep 2020 00:44 )   PT: 13.1 sec;   INR: 1.13 ratio         PTT - ( 29 Sep 2020 00:44 )  PTT:30.6 sec    Lipid Panel: Cholesterol, Serum 214  Direct   HDL Cholesterol, Serum 66  Triglycerides, Serum 108    Cardiac Enzymes: CARDIAC MARKERS ( 30 Sep 2020 06:09 )  <0.015 ng/mL / x     / x     / x     / x      CARDIAC MARKERS ( 29 Sep 2020 03:45 )  <0.015 ng/mL / x     / x     / x     / x      CARDIAC MARKERS ( 29 Sep 2020 00:44 )  <0.015 ng/mL / x     / x     / x     / x        Serum Pro-Brain Natriuretic Peptide: 2408 pg/mL (09-29-20 @ 19:29)    RADIOLOGY:   < from: Xray Chest 1 View-PORTABLE IMMEDIATE (Xray Chest 1 View-PORTABLE IMMEDIATE .) (09.29.20 @ 20:50) >  IMPRESSION:    Mild bibasilar discoid atelectasis.    < end of copied text >    ECG [my interpretation]: 9/29/2020 @ 19:11: Sinus rhythm, normal axis, normal EKG    TELEMETRY: n/a

## 2020-09-30 NOTE — CONSULT NOTE ADULT - SUBJECTIVE AND OBJECTIVE BOX
HPI:  89 year old Nigerian  speaking female AAO X 3 , lives with daughter presented to ED for epigastric /  mid sternal pain. Patient had 3 episodes of NBNB vomiting yesterday . Patient came to ED yesterday for nausea , vomiting fever and epigastric pain , her UA . blood and urine cultures were sent she got Rocephin last night and she started feeling better and asked ED for discharge , she was discharged on Ceftin . Her blood cultures grew gram negative rods and she was called back to hospital. currentl patient complains of mid sternal pain and some time RUQ pain when she wakes up, as per patient her RUQ pain is chronic in nature and is been there for many years. dDaughter at bed side also added that she is getting moer short of breath these days. Patient denies head ache, dizziness, numbness, tingling, palpitations bowel or urinary problems    History as above. Pt initially came to ED  fever, nausea, vomiting. Pt was discharged from ED with oral Ceftin but called back after blood culture showed growth of gram negative rods. Pt is Nigerian speaking and daughter helped with  translation. Pt denies fever, cough, chills, nausea at this time. She denies urinary symptoms like burning sensation while passing urine and increase in urinary frequency. Pt is afebrile overnight. WBC trended down to normal.   Pt is being treated with IV Ceftriaxone.           GOC: DNR/DNI, Molst form placed in chart. (29 Sep 2020 23:10)      PAST MEDICAL & SURGICAL HISTORY:  Anxiety    HTN (hypertension)    S/P hip replacement        No Known Allergies      Meds:  acetaminophen   Tablet .. 650 milliGRAM(s) Oral every 6 hours PRN  amLODIPine   Tablet 5 milliGRAM(s) Oral daily  cefTRIAXone   IVPB 1000 milliGRAM(s) IV Intermittent every 24 hours  enoxaparin Injectable 40 milliGRAM(s) SubCutaneous daily  escitalopram 10 milliGRAM(s) Oral daily  furosemide    Tablet 40 milliGRAM(s) Oral daily  influenza   Vaccine 0.5 milliLiter(s) IntraMuscular once  lisinopril 40 milliGRAM(s) Oral daily  melatonin 5 milliGRAM(s) Oral at bedtime  pantoprazole    Tablet 40 milliGRAM(s) Oral before breakfast  temazepam 7.5 milliGRAM(s) Oral at bedtime PRN      SOCIAL HISTORY:  Smoker:   NO          ETOH use:   NO              Ilicit Drug use:   NO  Occupation:  Assisted device use (Cane / Walker):  Live with: Daughter    FAMILY HISTORY: Not known       VITALS:  Vital Signs Last 24 Hrs  T(C): 37.6 (30 Sep 2020 05:29), Max: 37.6 (30 Sep 2020 05:29)  T(F): 99.6 (30 Sep 2020 05:29), Max: 99.6 (30 Sep 2020 05:29)  HR: 70 (30 Sep 2020 05:29) (62 - 81)  BP: 129/45 (30 Sep 2020 05:29) (129/45 - 188/62)  BP(mean): --  RR: 17 (30 Sep 2020 05:29) (17 - 18)  SpO2: 95% (30 Sep 2020 05:29) (95% - 96%)    LABS/DIAGNOSTIC TESTS:                          11.4   9.65  )-----------( 183      ( 30 Sep 2020 06:09 )             34.0     WBC Count: 9.65 K/uL ( @ 06:09)  WBC Count: 11.72 K/uL ( @ 19:29)  WBC Count: 13.11 K/uL ( @ 00:44)          136  |  104  |  13  ----------------------------<  105<H>  3.9   |  23  |  1.00    Ca    8.4      30 Sep 2020 06:09  Phos  2.8       Mg     2.2         TPro  6.1  /  Alb  2.8<L>  /  TBili  1.8<H>  /  DBili  x   /  AST  98<H>  /  ALT  157<H>  /  AlkPhos  168<H>        Urinalysis Basic - ( 29 Sep 2020 20:41 )    Color: Yellow / Appearance: Clear / S.010 / pH: x  Gluc: x / Ketone: Negative  / Bili: Negative / Urobili: Negative   Blood: x / Protein: Negative / Nitrite: Negative   Leuk Esterase: Small / RBC: 0-2 /HPF / WBC 3-5 /HPF   Sq Epi: x / Non Sq Epi: Few /HPF / Bacteria: Few /HPF        LIVER FUNCTIONS - ( 30 Sep 2020 06:09 )  Alb: 2.8 g/dL / Pro: 6.1 g/dL / ALK PHOS: 168 U/L / ALT: 157 U/L DA / AST: 98 U/L / GGT: x             PT/INR - ( 29 Sep 2020 00:44 )   PT: 13.1 sec;   INR: 1.13 ratio         PTT - ( 29 Sep 2020 00:44 )  PTT:30.6 sec    LACTATE:Lactate, Blood: 1.0 mmol/L ( @ 19:29)      ABG -     CULTURES:   .Urine Catheterized   @ 06:43   <10,000 CFU/mL Normal Urogenital Ramandeep  --  --      .Blood Blood   @ 06:42   Growth in aerobic and anaerobic bottles: Gram Negative Rods  "Due to technical problems, Proteus sp. will Not be reported as part of  the BCID panel until further notice"  ***Blood Panel PCR results on this specimen are available  approximately 3 hours after the Gram stain result.***  Gram stain, PCR, and/or culture results may not always  correspond due to difference in methodologies.  ************************************************************  This PCR assay was performed using [employer].  The following targets are tested for: Enterococcus,  vancomycin resistant enterococci, Listeria monocytogenes,  coagulase negative staphylococci, S. aureus,  methicillin resistant S. aureus, Streptococcus agalactiae  (Group B), S. pneumoniae, S. pyogenes (Group A),  Acinetobacter baumannii, Enterobacter cloacae, E. coli,  Klebsiella oxytoca, K. pneumoniae, Proteus sp.,  Serratia marcescens, Haemophilus influenzae,  Neisseria meningitidis, Pseudomonas aeruginosa, Candida  albicans, C. glabrata, C krusei, C parapsilosis,  C. tropicalis and the KPC resistance gene.  --  Blood Culture PCR          RADIOLOGY: < from: Xray Chest 1 View-PORTABLE IMMEDIATE (Xray Chest 1 View-PORTABLE IMMEDIATE .) (20 @ 20:50) >  EXAM:  XR CHEST PORTABLE IMMED 1V                            PROCEDURE DATE:  2020          INTERPRETATION:  CLINICAL INDICATION: 89 years  Female with bacteremia.    COMPARISON: Earlier the same day at 12:42 AM    AP view of the chest demonstrates mild bibasilar discoid atelectasis.. There is no pleural effusion. There is no pneumothorax.    The heart is normal in size. The aorta is atherosclerotic. There is no mediastinal or hilar mass.    The pulmonary vasculature is normal.    Mild thoracic degenerative changes are present.    IMPRESSION:    Mild bibasilar discoid atelectasis.    ---------------------------------------------------------------------------------------------------------------------------------------------------------------------------------------------------------------------------------------------------------      < from: US Hepatic & Pancreatic (20 @ 22:17) >    EXAM:  US LIVER AND PANCREAS                            PROCEDURE DATE:  2020          INTERPRETATION:  CLINICAL INFORMATION: Fever, elevated LFT    COMPARISON: None available.    TECHNIQUE: Limited sonogram of the abdomen.    FINDINGS:    Liver: 15.7 cm. Diffusely increased echogenicity.  Bile ducts: Normal caliber. Common bile duct measures 8 mm.  Gallbladder: No stones or wall thickening.  Pancreas: Visualized portions are within normal limits.  Right kidney: 10.4 cm. 5 mm sized cortical cyst. Slightly prominent right renal pelvis.  Ascites: None.  Aorta and IVC: Visualized portions are within normal limits.    IMPRESSION:    Hepatic steatosis.  No hydronephrosis or urolithiasis.              ROS  [  ] UNABLE TO ELICIT               HPI:  89 year old Slovak  speaking female AAO X 3 , lives with daughter presented to ED for epigastric /  mid sternal pain. Patient had 3 episodes of NBNB vomiting yesterday . Patient came to ED yesterday for nausea , vomiting fever and epigastric pain , her UA . blood and urine cultures were sent she got Rocephin last night and she started feeling better and asked ED for discharge , she was discharged on Ceftin . Her blood cultures grew gram negative rods and she was called back to hospital. currentl patient complains of mid sternal pain and some time RUQ pain when she wakes up, as per patient her RUQ pain is chronic in nature and is been there for many years. dDaughter at bed side also added that she is getting moer short of breath these days. Patient denies head ache, dizziness, numbness, tingling, palpitations bowel or urinary problems    History as above. Pt initially came to ED  fever, nausea, vomiting. Pt was discharged from ED with oral Ceftin but called back after blood culture showed growth of gram negative rods. Pt is Slovak speaking and daughter helped with  translation. Pt denies fever, cough, chills, nausea at this time. She denies urinary symptoms like burning sensation while passing urine and increase in urinary frequency. Pt is afebrile overnight. WBC trended down to normal.   Pt is being treated with IV Ceftriaxone.           GOC: DNR/DNI, Molst form placed in chart. (29 Sep 2020 23:10)      PAST MEDICAL & SURGICAL HISTORY:  Anxiety    HTN (hypertension)    S/P hip replacement        No Known Allergies      Meds:  acetaminophen   Tablet .. 650 milliGRAM(s) Oral every 6 hours PRN  amLODIPine   Tablet 5 milliGRAM(s) Oral daily  cefTRIAXone   IVPB 1000 milliGRAM(s) IV Intermittent every 24 hours  enoxaparin Injectable 40 milliGRAM(s) SubCutaneous daily  escitalopram 10 milliGRAM(s) Oral daily  furosemide    Tablet 40 milliGRAM(s) Oral daily  influenza   Vaccine 0.5 milliLiter(s) IntraMuscular once  lisinopril 40 milliGRAM(s) Oral daily  melatonin 5 milliGRAM(s) Oral at bedtime  pantoprazole    Tablet 40 milliGRAM(s) Oral before breakfast  temazepam 7.5 milliGRAM(s) Oral at bedtime PRN      SOCIAL HISTORY:  Smoker:   NO          ETOH use:   NO              Ilicit Drug use:   NO  Occupation:  Assisted device use (Cane / Walker):  Live with: Daughter    FAMILY HISTORY: Not known       VITALS:  Vital Signs Last 24 Hrs  T(C): 37.6 (30 Sep 2020 05:29), Max: 37.6 (30 Sep 2020 05:29)  T(F): 99.6 (30 Sep 2020 05:29), Max: 99.6 (30 Sep 2020 05:29)  HR: 70 (30 Sep 2020 05:29) (62 - 81)  BP: 129/45 (30 Sep 2020 05:29) (129/45 - 188/62)  BP(mean): --  RR: 17 (30 Sep 2020 05:29) (17 - 18)  SpO2: 95% (30 Sep 2020 05:29) (95% - 96%)    LABS/DIAGNOSTIC TESTS:                          11.4   9.65  )-----------( 183      ( 30 Sep 2020 06:09 )             34.0     WBC Count: 9.65 K/uL ( @ 06:09)  WBC Count: 11.72 K/uL ( @ 19:29)  WBC Count: 13.11 K/uL ( @ 00:44)          136  |  104  |  13  ----------------------------<  105<H>  3.9   |  23  |  1.00    Ca    8.4      30 Sep 2020 06:09  Phos  2.8       Mg     2.2         TPro  6.1  /  Alb  2.8<L>  /  TBili  1.8<H>  /  DBili  x   /  AST  98<H>  /  ALT  157<H>  /  AlkPhos  168<H>        Urinalysis Basic - ( 29 Sep 2020 20:41 )    Color: Yellow / Appearance: Clear / S.010 / pH: x  Gluc: x / Ketone: Negative  / Bili: Negative / Urobili: Negative   Blood: x / Protein: Negative / Nitrite: Negative   Leuk Esterase: Small / RBC: 0-2 /HPF / WBC 3-5 /HPF   Sq Epi: x / Non Sq Epi: Few /HPF / Bacteria: Few /HPF        LIVER FUNCTIONS - ( 30 Sep 2020 06:09 )  Alb: 2.8 g/dL / Pro: 6.1 g/dL / ALK PHOS: 168 U/L / ALT: 157 U/L DA / AST: 98 U/L / GGT: x             PT/INR - ( 29 Sep 2020 00:44 )   PT: 13.1 sec;   INR: 1.13 ratio         PTT - ( 29 Sep 2020 00:44 )  PTT:30.6 sec    LACTATE:Lactate, Blood: 1.0 mmol/L ( @ 19:29)      ABG -     CULTURES:   .Urine Catheterized   @ 06:43   <10,000 CFU/mL Normal Urogenital Ramandeep  --  --      .Blood Blood   @ 06:42   Growth in aerobic and anaerobic bottles: Gram Negative Rods  "Due to technical problems, Proteus sp. will Not be reported as part of  the BCID panel until further notice"  ***Blood Panel PCR results on this specimen are available  approximately 3 hours after the Gram stain result.***  Gram stain, PCR, and/or culture results may not always  correspond due to difference in methodologies.  ************************************************************  This PCR assay was performed using [employer].  The following targets are tested for: Enterococcus,  vancomycin resistant enterococci, Listeria monocytogenes,  coagulase negative staphylococci, S. aureus,  methicillin resistant S. aureus, Streptococcus agalactiae  (Group B), S. pneumoniae, S. pyogenes (Group A),  Acinetobacter baumannii, Enterobacter cloacae, E. coli,  Klebsiella oxytoca, K. pneumoniae, Proteus sp.,  Serratia marcescens, Haemophilus influenzae,  Neisseria meningitidis, Pseudomonas aeruginosa, Candida  albicans, C. glabrata, C krusei, C parapsilosis,  C. tropicalis and the KPC resistance gene.  --  Blood Culture PCR          RADIOLOGY: < from: Xray Chest 1 View-PORTABLE IMMEDIATE (Xray Chest 1 View-PORTABLE IMMEDIATE .) (20 @ 20:50) >  EXAM:  XR CHEST PORTABLE IMMED 1V                            PROCEDURE DATE:  2020          INTERPRETATION:  CLINICAL INDICATION: 89 years  Female with bacteremia.    COMPARISON: Earlier the same day at 12:42 AM    AP view of the chest demonstrates mild bibasilar discoid atelectasis.. There is no pleural effusion. There is no pneumothorax.    The heart is normal in size. The aorta is atherosclerotic. There is no mediastinal or hilar mass.    The pulmonary vasculature is normal.    Mild thoracic degenerative changes are present.    IMPRESSION:    Mild bibasilar discoid atelectasis.    ---------------------------------------------------------------------------------------------------------------------------------------------------------------------------------------------------------------------------------------------------------      < from: US Hepatic & Pancreatic (20 @ 22:17) >    EXAM:  US LIVER AND PANCREAS                            PROCEDURE DATE:  2020          INTERPRETATION:  CLINICAL INFORMATION: Fever, elevated LFT    COMPARISON: None available.    TECHNIQUE: Limited sonogram of the abdomen.    FINDINGS:    Liver: 15.7 cm. Diffusely increased echogenicity.  Bile ducts: Normal caliber. Common bile duct measures 8 mm.  Gallbladder: No stones or wall thickening.  Pancreas: Visualized portions are within normal limits.  Right kidney: 10.4 cm. 5 mm sized cortical cyst. Slightly prominent right renal pelvis.  Ascites: None.  Aorta and IVC: Visualized portions are within normal limits.    IMPRESSION:    Hepatic steatosis.  No hydronephrosis or urolithiasis.              ROS  [  ] UNABLE TO ELICIT               HPI:  89 year old Bermudian  speaking female AAO X 3 , lives with daughter presented to ED for epigastric /  mid sternal pain. Patient had 3 episodes of NBNB vomiting yesterday . Patient came to ED yesterday for nausea , vomiting fever and epigastric pain , her UA . blood and urine cultures were sent she got Rocephin last night and she started feeling better and asked ED for discharge , she was discharged on Ceftin . Her blood cultures grew gram negative rods and she was called back to hospital. currentl patient complains of mid sternal pain and some time RUQ pain when she wakes up, as per patient her RUQ pain is chronic in nature and is been there for many years. dDaughter at bed side also added that she is getting moer short of breath these days. Patient denies head ache, dizziness, numbness, tingling, palpitations bowel or urinary problems    History as above. Pt initially came to ED  fever, nausea, vomiting. Pt was discharged from ED with oral Ceftin but called back after blood culture showed growth of gram negative rods. Pt is Bermudian speaking and daughter helped with  translation. Pt denies fever, cough, chills, nausea at this time. She denies urinary symptoms like burning sensation while passing urine and increase in urinary frequency. Pt is afebrile overnight. WBC trended down to normal.   Pt is being treated with IV Ceftriaxone.           GOC: DNR/DNI, Molst form placed in chart. (29 Sep 2020 23:10)      PAST MEDICAL & SURGICAL HISTORY:  Anxiety    HTN (hypertension)    S/P hip replacement        No Known Allergies      Meds:  acetaminophen   Tablet .. 650 milliGRAM(s) Oral every 6 hours PRN  amLODIPine   Tablet 5 milliGRAM(s) Oral daily  cefTRIAXone   IVPB 1000 milliGRAM(s) IV Intermittent every 24 hours  enoxaparin Injectable 40 milliGRAM(s) SubCutaneous daily  escitalopram 10 milliGRAM(s) Oral daily  furosemide    Tablet 40 milliGRAM(s) Oral daily  influenza   Vaccine 0.5 milliLiter(s) IntraMuscular once  lisinopril 40 milliGRAM(s) Oral daily  melatonin 5 milliGRAM(s) Oral at bedtime  pantoprazole    Tablet 40 milliGRAM(s) Oral before breakfast  temazepam 7.5 milliGRAM(s) Oral at bedtime PRN      SOCIAL HISTORY:  Smoker:   NO          ETOH use:   NO              Ilicit Drug use:   NO  Occupation:  Assisted device use (Cane / Walker):  Live with: Daughter    FAMILY HISTORY: Not known       VITALS:  Vital Signs Last 24 Hrs  T(C): 37.6 (30 Sep 2020 05:29), Max: 37.6 (30 Sep 2020 05:29)  T(F): 99.6 (30 Sep 2020 05:29), Max: 99.6 (30 Sep 2020 05:29)  HR: 70 (30 Sep 2020 05:29) (62 - 81)  BP: 129/45 (30 Sep 2020 05:29) (129/45 - 188/62)  BP(mean): --  RR: 17 (30 Sep 2020 05:29) (17 - 18)  SpO2: 95% (30 Sep 2020 05:29) (95% - 96%)    LABS/DIAGNOSTIC TESTS:                          11.4   9.65  )-----------( 183      ( 30 Sep 2020 06:09 )             34.0     WBC Count: 9.65 K/uL ( @ 06:09)  WBC Count: 11.72 K/uL ( @ 19:29)  WBC Count: 13.11 K/uL ( @ 00:44)          136  |  104  |  13  ----------------------------<  105<H>  3.9   |  23  |  1.00    Ca    8.4      30 Sep 2020 06:09  Phos  2.8       Mg     2.2         TPro  6.1  /  Alb  2.8<L>  /  TBili  1.8<H>  /  DBili  x   /  AST  98<H>  /  ALT  157<H>  /  AlkPhos  168<H>        Urinalysis Basic - ( 29 Sep 2020 20:41 )    Color: Yellow / Appearance: Clear / S.010 / pH: x  Gluc: x / Ketone: Negative  / Bili: Negative / Urobili: Negative   Blood: x / Protein: Negative / Nitrite: Negative   Leuk Esterase: Small / RBC: 0-2 /HPF / WBC 3-5 /HPF   Sq Epi: x / Non Sq Epi: Few /HPF / Bacteria: Few /HPF        LIVER FUNCTIONS - ( 30 Sep 2020 06:09 )  Alb: 2.8 g/dL / Pro: 6.1 g/dL / ALK PHOS: 168 U/L / ALT: 157 U/L DA / AST: 98 U/L / GGT: x             PT/INR - ( 29 Sep 2020 00:44 )   PT: 13.1 sec;   INR: 1.13 ratio         PTT - ( 29 Sep 2020 00:44 )  PTT:30.6 sec    LACTATE:Lactate, Blood: 1.0 mmol/L ( @ 19:29)      ABG -     CULTURES:   .Urine Catheterized   @ 06:43   <10,000 CFU/mL Normal Urogenital Ramandeep  --  --      .Blood Blood   @ 06:42   Growth in aerobic and anaerobic bottles: Gram Negative Rods          RADIOLOGY: < from: Xray Chest 1 View-PORTABLE IMMEDIATE (Xray Chest 1 View-PORTABLE IMMEDIATE .) (20 @ 20:50) >  EXAM:  XR CHEST PORTABLE IMMED 1V                            PROCEDURE DATE:  2020          INTERPRETATION:  CLINICAL INDICATION: 89 years  Female with bacteremia.    COMPARISON: Earlier the same day at 12:42 AM    AP view of the chest demonstrates mild bibasilar discoid atelectasis.. There is no pleural effusion. There is no pneumothorax.    The heart is normal in size. The aorta is atherosclerotic. There is no mediastinal or hilar mass.    The pulmonary vasculature is normal.    Mild thoracic degenerative changes are present.    IMPRESSION:    Mild bibasilar discoid atelectasis.    ---------------------------------------------------------------------------------------------------------------------------------------------------------------------------------------------------------------------------------------------------------      < from: US Hepatic & Pancreatic (20 @ 22:17) >    EXAM:  US LIVER AND PANCREAS                            PROCEDURE DATE:  2020          INTERPRETATION:  CLINICAL INFORMATION: Fever, elevated LFT    COMPARISON: None available.    TECHNIQUE: Limited sonogram of the abdomen.    FINDINGS:    Liver: 15.7 cm. Diffusely increased echogenicity.  Bile ducts: Normal caliber. Common bile duct measures 8 mm.  Gallbladder: No stones or wall thickening.  Pancreas: Visualized portions are within normal limits.  Right kidney: 10.4 cm. 5 mm sized cortical cyst. Slightly prominent right renal pelvis.  Ascites: None.  Aorta and IVC: Visualized portions are within normal limits.    IMPRESSION:    Hepatic steatosis.  No hydronephrosis or urolithiasis.              ROS  [  ] UNABLE TO ELICIT

## 2020-09-30 NOTE — PROGRESS NOTE ADULT - PROBLEM SELECTOR PLAN 3
-Patients AST, ALT, ALP and bilirubin are elevated,now downtrending  -US done and noted showed hepatic steatosis, no  gall bladder pathology noted   will continue to trend  -CT Thickened noninflamed appendix measuring 1.1 cm in diameter with submucosal fat and no surrounding edema. The appendix terminates in a 10.8 x 4.1 x 4.0 cm partially rim calcified cystic structure in the right pelvis with adjacent 1.9 cm cyst. Consider appendiceal mucocele

## 2020-09-30 NOTE — CONSULT NOTE ADULT - RS GEN PE MLT RESP DETAILS PC
breath sounds equal/clear to auscultation bilaterally no rales/breath sounds equal/good air movement/no wheezes/clear to auscultation bilaterally/no rhonchi

## 2020-09-30 NOTE — CONSULT NOTE ADULT - ASSESSMENT
89 yo Mongolian-speaking F with HTN who presented with chest pain.     1. Chest pain: Less suspicion for cardiac etiology based on patient's description, unremarkable EKG, and negative cardiac enzymes    2. Possible HF exacerbation: Patient is euvolemic on exam and radiologic imaging, and her pro-BNP is mildly elevated (though range may not be definitively studied in her age group).  -S/p furosemide  -Agree with echocardiogram     3. HTN: On amlodipine, bisoprolol, and ramipril at home; can resume here as hemodynamically tolerated    ***Note that this is a preliminary note and any recommendations should NOT be carried out until this note is finalized. *** 89 yo English-speaking F with HTN who presented with chest pain.     1. Chest pain: Less suspicion for cardiac etiology based on patient's description, unremarkable EKG, and negative cardiac enzymes    2. Possible HF exacerbation: Patient is euvolemic on exam and radiologic imaging, and her pro-BNP is mildly elevated (though range may not be definitively studied in her age group).  -S/p furosemide x1, may switch to PRN at this point  -Agree with echocardiogram     3. HTN: On amlodipine, bisoprolol, and ramipril at home; can resume here as hemodynamically tolerated     89 yo Turkmen-speaking F with HTN who presented with chest pain.     1. Chest pain: Less suspicion for cardiac etiology based on patient's description, unremarkable EKG, and negative cardiac enzymes    2. Possible HF exacerbation: Patient is euvolemic on exam and radiologic imaging, and her pro-BNP is mildly elevated (though range may not be definitively studied in her age group).  -S/p furosemide x1, may switch to PRN at this point  -Agree with echocardiogram     3. HTN: On amlodipine, bisoprolol, and ramipril at home; can resume here as hemodynamically tolerated    4. LE ulcerated lesion: Consider wound care evaluation

## 2020-09-30 NOTE — PROGRESS NOTE ADULT - PROBLEM SELECTOR PLAN 2
-Patients ProBNP is elevated ( more than age corrected .  -CXR showed some discoid atelectasias .  -no signs of fluid over load on P/E  - Pending ECHO result  -will continue with ACE and b Blocker  -Lasix 40 mg PO added

## 2020-09-30 NOTE — PROGRESS NOTE ADULT - SUBJECTIVE AND OBJECTIVE BOX
PGY-1 Progress Note discussed with attending    PAGER #: [[phone]] TILL 5:00 PM  PLEASE CONTACT ON CALL TEAM:  - On Call Team (Please refer to Raphael) FROM 5:00 PM - 8:30PM  - Nightfloat Team FROM 8:30 -7:30 AM    CHIEF COMPLAINT & BRIEF HOSPITAL COURSE: 89 year old Bangladeshi speaking female AAO X 3 , lives with daughter presented to ED for epigastric /  mid sternal pain. Patient had 3 episodes of NBNB vomiting yesterday . Patient came to ED yesterday for nausea , vomiting fever and epigastric pain , her UA . blood and urine cultures were sent she got Rocephin last night and she started feeling better and asked ED for discharge , she was discharged on Ceftin . Her blood cultures grew gram negative rods and she was called back to hospital. currentl patient complains of mid sternal pain and some time RUQ pain when she wakes up, as per patient her RUQ pain is chronic in nature and is been there for many years. dDaughter at bed side also added that she is getting moer short of breath these days. Patient denies head ache, dizziness, numbness, tingling, palpitaions, bowel or urinary prblems.    INTERVAL HPI/OVERNIGHT EVENTS: No issue overnight. Pt assessed at bedside, complained of mild epigastric pain.      REVIEW OF SYSTEMS:  CONSTITUTIONAL: No fever, weight loss, or fatigue  RESPIRATORY: No cough, wheezing, chills or hemoptysis; No shortness of breath  CARDIOVASCULAR: No chest pain, palpitations, dizziness, or leg swelling  GASTROINTESTINAL:  abdominal pain +ve. No nausea, vomiting, or hematemesis; No diarrhea or constipation. No melena or hematochezia.  GENITOURINARY: No dysuria or hematuria, urinary frequency  NEUROLOGICAL: No headaches, memory loss, loss of strength, numbness, or tremors  SKIN: No itching, burning, rashes, or lesions     Vital Signs Last 24 Hrs  T(C): 36.8 (30 Sep 2020 13:36), Max: 37.6 (30 Sep 2020 05:29)  T(F): 98.2 (30 Sep 2020 13:36), Max: 99.6 (30 Sep 2020 05:29)  HR: 69 (30 Sep 2020 13:36) (62 - 81)  BP: 140/51 (30 Sep 2020 13:36) (129/45 - 188/62)  BP(mean): --  RR: 17 (30 Sep 2020 13:36) (17 - 18)  SpO2: 95% (30 Sep 2020 13:36) (95% - 96%)    PHYSICAL EXAMINATION:  GENERAL: NAD,   HEAD:  Atraumatic, Normocephalic  EYES:  conjunctiva and sclera clear  NECK: Supple, No JVD, Normal thyroid  CHEST/LUNG: Clear to auscultation. Clear to percussion bilaterally; No rales, rhonchi, wheezing, or rubs  HEART: Regular rate and rhythm +ve systolic ; No murmurs, rubs, or gallops  ABDOMEN: Soft, Nontender, Nondistended; Bowel sounds present  NERVOUS SYSTEM:  Alert & Oriented X3,    EXTREMITIES:  2+ Peripheral Pulses, No clubbing, cyanosis, or edema  SKIN: warm dry                          11.4   9.65  )-----------( 183      ( 30 Sep 2020 06:09 )             34.0     09-30    136  |  104  |  13  ----------------------------<  105<H>  3.9   |  23  |  1.00    Ca    8.4      30 Sep 2020 06:09  Phos  2.8     09-30  Mg     2.2     09-30    TPro  6.1  /  Alb  2.8<L>  /  TBili  1.8<H>  /  DBili  0.4<H>  /  AST  98<H>  /  ALT  157<H>  /  AlkPhos  168<H>  09-30    LIVER FUNCTIONS - ( 30 Sep 2020 06:09 )  Alb: 2.8 g/dL / Pro: 6.1 g/dL / ALK PHOS: 168 U/L / ALT: 157 U/L DA / AST: 98 U/L / GGT: x           CARDIAC MARKERS ( 30 Sep 2020 12:25 )  <0.015 ng/mL / x     / 118 U/L / x     / 1.7 ng/mL  CARDIAC MARKERS ( 30 Sep 2020 06:09 )  <0.015 ng/mL / x     / x     / x     / x      CARDIAC MARKERS ( 29 Sep 2020 03:45 )  <0.015 ng/mL / x     / x     / x     / x      CARDIAC MARKERS ( 29 Sep 2020 00:44 )  <0.015 ng/mL / x     / x     / x     / x          PT/INR - ( 29 Sep 2020 00:44 )   PT: 13.1 sec;   INR: 1.13 ratio         PTT - ( 29 Sep 2020 00:44 )  PTT:30.6 sec    CAPILLARY BLOOD GLUCOSE      RADIOLOGY & ADDITIONAL TESTS:

## 2020-09-30 NOTE — CONSULT NOTE ADULT - NEGATIVE GENERAL GENITOURINARY SYMPTOMS
no dysuria/normal urinary frequency normal urinary frequency/no dysuria/no flank pain L/no flank pain R/no urinary hesitancy/no hematuria

## 2020-09-30 NOTE — CONSULT NOTE ADULT - GASTROINTESTINAL DETAILS
no distention/soft/nontender soft/bowel sounds normal/no rebound tenderness/no rigidity/nontender/no distention/no guarding/no organomegaly

## 2020-09-30 NOTE — CONSULT NOTE ADULT - MUSCULOSKELETAL
details… detailed exam ROM intact/no joint swelling no joint swelling/no joint erythema/no joint warmth

## 2020-09-30 NOTE — CONSULT NOTE ADULT - ATTENDING COMMENTS
Thank you for the courtesy of a consultation, please contact me for any additional questions
pt seen and  examined with ID resident

## 2020-09-30 NOTE — PROGRESS NOTE ADULT - PROBLEM SELECTOR PLAN 1
-Patients blood cultures grew e coli  -On Rocephin day 1  -Source of bacteremia can be UTI as her UA was positive from yesterday with fever on presentation.   -US hepatic and pancreatic done and noted , no intra abdominal pathology noted except for  10.4 cm. 5 mm sized cortical cyst in kidney.  -Pending second blood culture  -CT abdomen and pelvis thickened noninflamed appendix measuring 1.1 cm in diameter with submucosal fat and no surrounding edema. The appendix terminates in a 10.8 x 4.1 x 4.0 cm partially rim calcified cystic structure in the right pelvis with adjacent 1.9 cm cyst. Consider appendiceal mucocele

## 2020-09-30 NOTE — CONSULT NOTE ADULT - ASSESSMENT
E. Coli Bacteremia  Transaminitis   Likely has Cholangitis  Leukocytosis - normalized    Plan - Cont Rocephin 1 gm iv q24hrs   Repeat blood cultures in am

## 2020-10-01 DIAGNOSIS — K76.0 FATTY (CHANGE OF) LIVER, NOT ELSEWHERE CLASSIFIED: ICD-10-CM

## 2020-10-01 DIAGNOSIS — R74.0 NONSPECIFIC ELEVATION OF LEVELS OF TRANSAMINASE AND LACTIC ACID DEHYDROGENASE [LDH]: ICD-10-CM

## 2020-10-01 DIAGNOSIS — K38.8 OTHER SPECIFIED DISEASES OF APPENDIX: ICD-10-CM

## 2020-10-01 LAB
-  AMIKACIN: SIGNIFICANT CHANGE UP
-  AMPICILLIN/SULBACTAM: SIGNIFICANT CHANGE UP
-  AMPICILLIN: SIGNIFICANT CHANGE UP
-  AZTREONAM: SIGNIFICANT CHANGE UP
-  CEFAZOLIN: SIGNIFICANT CHANGE UP
-  CEFEPIME: SIGNIFICANT CHANGE UP
-  CEFOXITIN: SIGNIFICANT CHANGE UP
-  CEFTRIAXONE: SIGNIFICANT CHANGE UP
-  CIPROFLOXACIN: SIGNIFICANT CHANGE UP
-  ERTAPENEM: SIGNIFICANT CHANGE UP
-  GENTAMICIN: SIGNIFICANT CHANGE UP
-  IMIPENEM: SIGNIFICANT CHANGE UP
-  LEVOFLOXACIN: SIGNIFICANT CHANGE UP
-  MEROPENEM: SIGNIFICANT CHANGE UP
-  PIPERACILLIN/TAZOBACTAM: SIGNIFICANT CHANGE UP
-  TOBRAMYCIN: SIGNIFICANT CHANGE UP
-  TRIMETHOPRIM/SULFAMETHOXAZOLE: SIGNIFICANT CHANGE UP
ALBUMIN SERPL ELPH-MCNC: 2.8 G/DL — LOW (ref 3.5–5)
ALP SERPL-CCNC: 150 U/L — HIGH (ref 40–120)
ALT FLD-CCNC: 104 U/L DA — HIGH (ref 10–60)
ANION GAP SERPL CALC-SCNC: 6 MMOL/L — SIGNIFICANT CHANGE UP (ref 5–17)
AST SERPL-CCNC: 38 U/L — SIGNIFICANT CHANGE UP (ref 10–40)
BILIRUB SERPL-MCNC: 0.8 MG/DL — SIGNIFICANT CHANGE UP (ref 0.2–1.2)
BUN SERPL-MCNC: 12 MG/DL — SIGNIFICANT CHANGE UP (ref 7–18)
CALCIUM SERPL-MCNC: 8.7 MG/DL — SIGNIFICANT CHANGE UP (ref 8.4–10.5)
CHLORIDE SERPL-SCNC: 105 MMOL/L — SIGNIFICANT CHANGE UP (ref 96–108)
CO2 SERPL-SCNC: 26 MMOL/L — SIGNIFICANT CHANGE UP (ref 22–31)
CREAT SERPL-MCNC: 0.83 MG/DL — SIGNIFICANT CHANGE UP (ref 0.5–1.3)
CULTURE RESULTS: SIGNIFICANT CHANGE UP
CULTURE RESULTS: SIGNIFICANT CHANGE UP
GLUCOSE SERPL-MCNC: 104 MG/DL — HIGH (ref 70–99)
HCT VFR BLD CALC: 34.5 % — SIGNIFICANT CHANGE UP (ref 34.5–45)
HGB BLD-MCNC: 11.5 G/DL — SIGNIFICANT CHANGE UP (ref 11.5–15.5)
INR BLD: 1.09 RATIO — SIGNIFICANT CHANGE UP (ref 0.88–1.16)
LEGIONELLA AG UR QL: NEGATIVE — SIGNIFICANT CHANGE UP
MCHC RBC-ENTMCNC: 27.1 PG — SIGNIFICANT CHANGE UP (ref 27–34)
MCHC RBC-ENTMCNC: 33.3 GM/DL — SIGNIFICANT CHANGE UP (ref 32–36)
MCV RBC AUTO: 81.4 FL — SIGNIFICANT CHANGE UP (ref 80–100)
METHOD TYPE: SIGNIFICANT CHANGE UP
NRBC # BLD: 0 /100 WBCS — SIGNIFICANT CHANGE UP (ref 0–0)
ORGANISM # SPEC MICROSCOPIC CNT: SIGNIFICANT CHANGE UP
PLATELET # BLD AUTO: 190 K/UL — SIGNIFICANT CHANGE UP (ref 150–400)
POTASSIUM SERPL-MCNC: 4 MMOL/L — SIGNIFICANT CHANGE UP (ref 3.5–5.3)
POTASSIUM SERPL-SCNC: 4 MMOL/L — SIGNIFICANT CHANGE UP (ref 3.5–5.3)
PROT SERPL-MCNC: 6.4 G/DL — SIGNIFICANT CHANGE UP (ref 6–8.3)
PROTHROM AB SERPL-ACNC: 12.7 SEC — SIGNIFICANT CHANGE UP (ref 10.6–13.6)
RBC # BLD: 4.24 M/UL — SIGNIFICANT CHANGE UP (ref 3.8–5.2)
RBC # FLD: 13.4 % — SIGNIFICANT CHANGE UP (ref 10.3–14.5)
SODIUM SERPL-SCNC: 137 MMOL/L — SIGNIFICANT CHANGE UP (ref 135–145)
SPECIMEN SOURCE: SIGNIFICANT CHANGE UP
SPECIMEN SOURCE: SIGNIFICANT CHANGE UP
WBC # BLD: 6.42 K/UL — SIGNIFICANT CHANGE UP (ref 3.8–10.5)
WBC # FLD AUTO: 6.42 K/UL — SIGNIFICANT CHANGE UP (ref 3.8–10.5)

## 2020-10-01 PROCEDURE — 99232 SBSQ HOSP IP/OBS MODERATE 35: CPT | Mod: GC

## 2020-10-01 PROCEDURE — 99223 1ST HOSP IP/OBS HIGH 75: CPT

## 2020-10-01 RX ORDER — ONDANSETRON 8 MG/1
4 TABLET, FILM COATED ORAL EVERY 4 HOURS
Refills: 0 | Status: DISCONTINUED | OUTPATIENT
Start: 2020-10-01 | End: 2020-10-02

## 2020-10-01 RX ADMIN — Medication 100 MILLIGRAM(S): at 05:15

## 2020-10-01 RX ADMIN — LISINOPRIL 40 MILLIGRAM(S): 2.5 TABLET ORAL at 05:15

## 2020-10-01 RX ADMIN — CEFTRIAXONE 100 MILLIGRAM(S): 500 INJECTION, POWDER, FOR SOLUTION INTRAMUSCULAR; INTRAVENOUS at 12:32

## 2020-10-01 RX ADMIN — Medication 5 MILLIGRAM(S): at 21:20

## 2020-10-01 RX ADMIN — AMLODIPINE BESYLATE 5 MILLIGRAM(S): 2.5 TABLET ORAL at 05:15

## 2020-10-01 RX ADMIN — Medication 100 MILLIGRAM(S): at 13:26

## 2020-10-01 RX ADMIN — Medication 25 MILLIGRAM(S): at 19:31

## 2020-10-01 RX ADMIN — Medication 100 MILLIGRAM(S): at 21:20

## 2020-10-01 RX ADMIN — ENOXAPARIN SODIUM 40 MILLIGRAM(S): 100 INJECTION SUBCUTANEOUS at 12:32

## 2020-10-01 RX ADMIN — ESCITALOPRAM OXALATE 10 MILLIGRAM(S): 10 TABLET, FILM COATED ORAL at 12:32

## 2020-10-01 RX ADMIN — Medication 25 MILLIGRAM(S): at 05:15

## 2020-10-01 RX ADMIN — PANTOPRAZOLE SODIUM 40 MILLIGRAM(S): 20 TABLET, DELAYED RELEASE ORAL at 05:16

## 2020-10-01 NOTE — CONSULT NOTE ADULT - ASSESSMENT
89 year old female with a PMH of "Heart Murmur", umbilical hernia, HTN who presented with a chief complaint of chest pain. Patient found to have e. coli bacteremia and +cellulitis to RLE wound 2/2 dog bite. Hepatology consulted for transaminitis.  [place]  # [id number] 89 year old female with a PMH of "Heart Murmur", umbilical hernia, HTN who presented with a chief complaint of epigastric pain, one episode of fever (39C), and 3 episodes of NBNB vomiting, found to have positive UA with negative UC, RLE wound 2/2 dog bite and E. coli bacteremia along with mixed pattern liver enzyme elevation with mild hyperbilirubinemia with hepatic steatosis on US abd, and cholelithiasis on CT abd, but w/o signs or symptoms of cholecystitis and with normal bile ducts. Incidentally she was also found a RLQ structure reported as possible appendiceal mucocele vs. ovarian/Fallopian etiology. Hepatology  was consulted for elevated liver enzymes with mild hyperbilirubinemia.

## 2020-10-01 NOTE — CONSULT NOTE ADULT - SUBJECTIVE AND OBJECTIVE BOX
[facility]    Patient is a 89y old  Female who presents with a chief complaint of chest pain, gram negative bacteremia (01 Oct 2020 07:52)    HPI:  89 year old Citizen of [place] speaking female AAO X 3 , lives with daughter presented to ED for epigastric /  mid sternal pain. Patient had 3 episodes of NBNB vomiting yesterday . Patient came to ED yesterday for nausea , vomiting fever and epigastric pain , her UA . blood and urine cultures were sent she got Rocephin last night and she started feeling better and asked ED for discharge , she was discharged on Ceftin . Her blood cultures grew gram negative rods and she was called back to hospital. currentl patient complains of mid sternal pain and some time RUQ pain when she wakes up, as per patient her RUQ pain is chronic in nature and is been there for many years. dDaughter at bed side also added that she is getting moer short of breath these days. Patient denies head ache, dizziness, numbness, tingling, palpitaions, bowel or urinary prblems.          GOC: DNR/DNI, Molst form placed in chart. (29 Sep 2020 23:10)    PMH/PSH:  PAST MEDICAL & SURGICAL HISTORY:  Anxiety    HTN (hypertension)    S/P hip replacement      FH:  FAMILY HISTORY:      MEDS:  MEDICATIONS  (STANDING):  amLODIPine   Tablet 5 milliGRAM(s) Oral daily  cefTRIAXone   IVPB 1000 milliGRAM(s) IV Intermittent every 24 hours  enoxaparin Injectable 40 milliGRAM(s) SubCutaneous daily  escitalopram 10 milliGRAM(s) Oral daily  influenza   Vaccine 0.5 milliLiter(s) IntraMuscular once  lisinopril 40 milliGRAM(s) Oral daily  melatonin 5 milliGRAM(s) Oral at bedtime  metoprolol tartrate 25 milliGRAM(s) Oral every 12 hours  metroNIDAZOLE  IVPB      metroNIDAZOLE  IVPB 500 milliGRAM(s) IV Intermittent every 8 hours  pantoprazole    Tablet 40 milliGRAM(s) Oral before breakfast    MEDICATIONS  (PRN):  acetaminophen   Tablet .. 650 milliGRAM(s) Oral every 6 hours PRN Temp greater or equal to 38C (100.4F)  furosemide    Tablet 40 milliGRAM(s) Oral daily PRN pedal edema  temazepam 7.5 milliGRAM(s) Oral at bedtime PRN Insomnia    Allergies    No Known Allergies    Intolerances            CONSTITUTIONAL:  No weight loss, fever, chills, weakness or fatigue.  HEENT:  Eyes:  No visual loss, blurred vision, double vision or yellow sclerae. Ears, Nose, Throat:  No hearing loss, sneezing, congestion, runny nose or sore throat.  SKIN:  No rash or itching.  CARDIOVASCULAR:  No chest pain, chest pressure or chest discomfort. No palpitations or edema.  RESPIRATORY:  No shortness of breath, cough or sputum.  GASTROINTESTINAL:  SEE HPI  GENITOURINARY:  No dysuria, hematuria, urinary frequency  NEUROLOGICAL:  No headache, dizziness, syncope, paralysis, ataxia, numbness or tingling in the extremities. No change in bowel or bladder control.  MUSCULOSKELETAL:  No muscle, back pain, joint pain or stiffness.  HEMATOLOGIC:  No anemia, bleeding or bruising.  LYMPHATICS:  No enlarged nodes. No history of splenectomy.  PSYCHIATRIC:  No history of depression or anxiety.  ENDOCRINOLOGIC:  No reports of sweating, cold or heat intolerance. No polyuria or polydipsia.      ______________________________________________________________________  PHYSICAL EXAM:  T(C): 37.1 (10-01-20 @ 04:59), Max: 37.1 (10-01-20 @ 04:59)  HR: 65 (10-01-20 @ 04:59)  BP: 142/71 (10-01-20 @ 04:59)  RR: 17 (10-01-20 @ 04:59)  SpO2: 96% (10-01-20 @ 04:59)  Wt(kg): --      GEN: NAD, normocephalic  CVS: S1S2+  CHEST: clear to auscultation  ABD: soft , nontender, nondistended, bowel sounds present  EXTR: no cyanosis, no clubbing, no edema  NEURO: Awake and alert; oriented .....  SKIN:  warm;  non icteric    ______________________________________________________________________  LABS:                        11.5   6.42  )-----------( 190      ( 01 Oct 2020 06:31 )             34.5     10-01    137  |  105  |  12  ----------------------------<  104<H>  4.0   |  26  |  0.83    Ca    8.7      01 Oct 2020 06:31  Phos  2.8     09-30  Mg     2.2     09-30    TPro  6.4  /  Alb  2.8<L>  /  TBili  0.8  /  DBili  x   /  AST  38  /  ALT  104<H>  /  AlkPhos  150<H>  10-01    LIVER FUNCTIONS - ( 01 Oct 2020 06:31 )  Alb: 2.8 g/dL / Pro: 6.4 g/dL / ALK PHOS: 150 U/L / ALT: 104 U/L DA / AST: 38 U/L / GGT: x           PT/INR - ( 01 Oct 2020 06:31 )   PT: 12.7 sec;   INR: 1.09 ratio           ____________________________________________    IMAGING:    ______________________________________________________________________  Non-contrast CT A/P - No bowel obstruction. Thickened noninflamed appendix measuring 1.1 cm in diameter with submucosal fat and no surrounding edema. The appendix terminates in a 10.8 x 4.1 x 4.0 cm partially rim calcified cystic structure in the right pelvis with adjacent 1.9 cm cyst. Sigmoid diverticulosis without diverticulitis.     Hepatic US - Hepatic steatosis and No hydronephrosis or urolithiasis.            [facility]    Patient is a 89y old  Female who presents with a chief complaint of chest pain, gram negative bacteremia (01 Oct 2020 07:52)    HPI:  89 year old Iranian speaking female AAO X 3 , lives with daughter presented to ED for epigastric /  mid sternal pain. Patient had 3 episodes of NBNB vomiting yesterday . Patient came to ED yesterday for nausea , vomiting fever and epigastric pain , her UA . blood and urine cultures were sent she got Rocephin last night and she started feeling better and asked ED for discharge , she was discharged on Ceftin . Her blood cultures grew gram negative rods and she was called back to hospital. currentl patient complains of mid sternal pain and some time RUQ pain when she wakes up, as per patient her RUQ pain is chronic in nature and is been there for many years. dDaughter at bed side also added that she is getting moer short of breath these days. Patient denies head ache, dizziness, numbness, tingling, palpitaions, bowel or urinary prblems.          GOC: DNR/DNI, Molst form placed in chart. (29 Sep 2020 23:10)    PMH/PSH:  PAST MEDICAL & SURGICAL HISTORY:  Anxiety    HTN (hypertension)    S/P hip replacement      FH:  FAMILY HISTORY:      MEDS:  MEDICATIONS  (STANDING):  amLODIPine   Tablet 5 milliGRAM(s) Oral daily  cefTRIAXone   IVPB 1000 milliGRAM(s) IV Intermittent every 24 hours  enoxaparin Injectable 40 milliGRAM(s) SubCutaneous daily  escitalopram 10 milliGRAM(s) Oral daily  influenza   Vaccine 0.5 milliLiter(s) IntraMuscular once  lisinopril 40 milliGRAM(s) Oral daily  melatonin 5 milliGRAM(s) Oral at bedtime  metoprolol tartrate 25 milliGRAM(s) Oral every 12 hours  metroNIDAZOLE  IVPB      metroNIDAZOLE  IVPB 500 milliGRAM(s) IV Intermittent every 8 hours  pantoprazole    Tablet 40 milliGRAM(s) Oral before breakfast    MEDICATIONS  (PRN):  acetaminophen   Tablet .. 650 milliGRAM(s) Oral every 6 hours PRN Temp greater or equal to 38C (100.4F)  furosemide    Tablet 40 milliGRAM(s) Oral daily PRN pedal edema  temazepam 7.5 milliGRAM(s) Oral at bedtime PRN Insomnia    Allergies    No Known Allergies    Intolerances            CONSTITUTIONAL:  No weight loss, fever, chills, weakness or fatigue.  HEENT:  Eyes:  No visual loss, blurred vision, double vision or yellow sclerae. Ears, Nose, Throat:  No hearing loss, sneezing, congestion, runny nose or sore throat.  SKIN:  No rash or itching.  CARDIOVASCULAR:  No chest pain, chest pressure or chest discomfort. No palpitations or edema.  RESPIRATORY:  No shortness of breath, cough or sputum.  GASTROINTESTINAL:  SEE HPI  GENITOURINARY:  No dysuria, hematuria, urinary frequency  NEUROLOGICAL:  No headache, dizziness, syncope, paralysis, ataxia, numbness or tingling in the extremities. No change in bowel or bladder control.  MUSCULOSKELETAL:  No muscle, back pain, joint pain or stiffness.  HEMATOLOGIC:  No anemia, bleeding or bruising.  LYMPHATICS:  No enlarged nodes. No history of splenectomy.  PSYCHIATRIC:  No history of depression or anxiety.  ENDOCRINOLOGIC:  No reports of sweating, cold or heat intolerance. No polyuria or polydipsia.      ______________________________________________________________________  PHYSICAL EXAM:  T(C): 37.1 (10-01-20 @ 04:59), Max: 37.1 (10-01-20 @ 04:59)  HR: 65 (10-01-20 @ 04:59)  BP: 142/71 (10-01-20 @ 04:59)  RR: 17 (10-01-20 @ 04:59)  SpO2: 96% (10-01-20 @ 04:59)  Wt(kg): --      GEN: NAD, normocephalic  CVS: S1S2+  CHEST: clear to auscultation  ABD: soft , mildly tender epigastrum, nondistended, bowel sounds present  EXTR: no cyanosis, no clubbing, no edema  NEURO: Awake and alert; oriented to person, place, time, and situation   SKIN:  warm;  non icteric    ______________________________________________________________________  LABS:                        11.5   6.42  )-----------( 190      ( 01 Oct 2020 06:31 )             34.5     10-01    137  |  105  |  12  ----------------------------<  104<H>  4.0   |  26  |  0.83    Ca    8.7      01 Oct 2020 06:31  Phos  2.8     09-30  Mg     2.2     09-30    TPro  6.4  /  Alb  2.8<L>  /  TBili  0.8  /  DBili  x   /  AST  38  /  ALT  104<H>  /  AlkPhos  150<H>  10-01    LIVER FUNCTIONS - ( 01 Oct 2020 06:31 )  Alb: 2.8 g/dL / Pro: 6.4 g/dL / ALK PHOS: 150 U/L / ALT: 104 U/L DA / AST: 38 U/L / GGT: x           PT/INR - ( 01 Oct 2020 06:31 )   PT: 12.7 sec;   INR: 1.09 ratio           ____________________________________________    IMAGING:    ______________________________________________________________________  Non-contrast CT A/P - No bowel obstruction. Thickened noninflamed appendix measuring 1.1 cm in diameter with submucosal fat and no surrounding edema. The appendix terminates in a 10.8 x 4.1 x 4.0 cm partially rim calcified cystic structure in the right pelvis with adjacent 1.9 cm cyst. Sigmoid diverticulosis without diverticulitis.     Hepatic US - Hepatic steatosis and No hydronephrosis or urolithiasis.            [facility]  [place]  # [id number]    Patient is a 89y old  Female who presents with a chief complaint of chest pain, gram negative bacteremia (01 Oct 2020 07:52)    HPI:  89 year old [place]yan speaking AAOx3 female, lives with daughter, presented to ED initially on 9/29/2020 for one day history of epigastric /  mid sternal pain a/w fever (39C) and 3 episodes of NBNB vomiting the day prior of admission. She was seen at ED, found to have positive UA and was discharged with antibiotis, however she was called back because of positive blood culture growing E. coli and was admitted on 9/29 and started on ceftriaxone on 9/30 with metronidazole added on 10/1. Her initial UA was positive, but urine culture was negative (even prior antibiotics).  She was hemodynamically stable, without recorded fever in hospital. Labs were significant for leukocytosis (13.1), neutrophilia (11.52) and abnormal liver enzymes and hyperbilirubinemia. On admission , , , Se total bilirubin 2.4, albumin 3.3. During hospitalization leukocytosis improved (9.65), with improving liver tests (-104, -38, -150, serum bilirubin normalized, 0.8).  US abdomen suggested hepatic steatosis, normal bile ducts and GB. CT abd/pelvis w/o contrast showed cholelithiasis but again normal bile ducts and no cholecystitis. It also revealed a RLQ structure, reported as possible appendiceal mucocele, or ovarian/R Fallopian pathology.  She denied prior history or family history of liver disease or cancer (other than grandfather unknown cancer). She mentioned decades long RUQ pain in the morning on awakening, resolves on its own and has not changed in nature. She denied taking pain medications, herbal supplements, alcohol, drugs. She also c/o worsening SOB on exertion, but states that slowly she can walk an hour. ROS was negative otherwise, she denied urinary symptoms.   Hepatology was consulted for elevated liver enzymes.   Notably she was also noted to have RLE wound 2/2 dog bite.     GOC: DNR/DNI, Molst form placed in chart. (29 Sep 2020 23:10)    PMH/PSH:  PAST MEDICAL & SURGICAL HISTORY:  Anxiety  HTN (hypertension)  S/P hip replacement      FH:  FAMILY HISTORY: grandfather unknown cancer      MEDS:  MEDICATIONS  (STANDING):  amLODIPine   Tablet 5 milliGRAM(s) Oral daily  cefTRIAXone   IVPB 1000 milliGRAM(s) IV Intermittent every 24 hours  enoxaparin Injectable 40 milliGRAM(s) SubCutaneous daily  escitalopram 10 milliGRAM(s) Oral daily  influenza   Vaccine 0.5 milliLiter(s) IntraMuscular once  lisinopril 40 milliGRAM(s) Oral daily  melatonin 5 milliGRAM(s) Oral at bedtime  metoprolol tartrate 25 milliGRAM(s) Oral every 12 hours  metroNIDAZOLE  IVPB      metroNIDAZOLE  IVPB 500 milliGRAM(s) IV Intermittent every 8 hours  pantoprazole    Tablet 40 milliGRAM(s) Oral before breakfast    MEDICATIONS  (PRN):  acetaminophen   Tablet .. 650 milliGRAM(s) Oral every 6 hours PRN Temp greater or equal to 38C (100.4F)  furosemide    Tablet 40 milliGRAM(s) Oral daily PRN pedal edema  temazepam 7.5 milliGRAM(s) Oral at bedtime PRN Insomnia    Allergies  No Known Allergies      CONSTITUTIONAL:  No weight loss, had fever at home (one episode 39) with chills   HEENT:  Eyes:  No visual loss, blurred vision, double vision or yellow sclerae. Ears, Nose, Throat:  No hearing loss, sneezing, congestion, runny nose or sore throat.  SKIN:  No rash or itching.  CARDIOVASCULAR:  No chest pain, chest pressure or chest discomfort. No palpitations or edema.  RESPIRATORY:  No shortness of breath, cough or sputum. (Mentions BATRES, but walks an hour/day)  GASTROINTESTINAL:  Had epigastric pain on admission, a/w 3 episodes of NBNB vomiting prior admission, denies diarrhea, blood in stool, melena  GENITOURINARY:  No dysuria, hematuria, urinary frequency  NEUROLOGICAL:  No headache, dizziness, syncope, paralysis, ataxia, numbness or tingling in the extremities. No change in bowel or bladder control.  MUSCULOSKELETAL:  No muscle, back pain, joint pain or stiffness.  HEMATOLOGIC:  No anemia, bleeding or bruising.  LYMPHATICS:  No enlarged nodes. No history of splenectomy.  PSYCHIATRIC:  No history of depression or anxiety.  ENDOCRINOLOGIC:  No reports of sweating, cold or heat intolerance. No polyuria or polydipsia.      ______________________________________________________________________  PHYSICAL EXAM:  T(C): 37.1 (10-01-20 @ 04:59), Max: 37.1 (10-01-20 @ 04:59)  HR: 65 (10-01-20 @ 04:59)  BP: 142/71 (10-01-20 @ 04:59)  RR: 17 (10-01-20 @ 04:59)  SpO2: 96% (10-01-20 @ 04:59)  Wt(kg): --      GEN: NAD, normocephalic  CVS: RRR  CHEST: clear to auscultation b/l  ABD: soft , nondistended, normoactive bowel sounds, mild periumbiliacal tenderness, neg Fajardo, no hepatosplenomegaly, no rebound or guarding, no CVA tenderness, no tenderness at McBurney point, neg Rovsing sign  EXTR: no cyanosis, no clubbing, no edema  NEURO: Awake and alert; oriented to person, place, time, and situation   SKIN:  warm;  non icteric    ______________________________________________________________________  LABS:                        11.5   6.42  )-----------( 190      ( 01 Oct 2020 06:31 )             34.5     10-01    137  |  105  |  12  ----------------------------<  104<H>  4.0   |  26  |  0.83    Ca    8.7      01 Oct 2020 06:31  Phos  2.8     09-30  Mg     2.2     09-30    TPro  6.4  /  Alb  2.8<L>  /  TBili  0.8  /  DBili  x   /  AST  38  /  ALT  104<H>  /  AlkPhos  150<H>  10-01    LIVER FUNCTIONS - ( 01 Oct 2020 06:31 )  Alb: 2.8 g/dL / Pro: 6.4 g/dL / ALK PHOS: 150 U/L / ALT: 104 U/L DA / AST: 38 U/L / GGT: x           PT/INR - ( 01 Oct 2020 06:31 )   PT: 12.7 sec;   INR: 1.09 ratio           ____________________________________________    IMAGING:    ______________________________________________________________________  Non-contrast CT A/P   LIVER: Within normal limits.  BILE DUCTS: Normal caliber.  GALLBLADDER: Cholelithiasis.  SPLEEN: Within normal limits.  PANCREAS: Within normal limits.  ADRENALS: Within normal limits.  KIDNEYS/URETERS: No hydroureteronephrosis or calculi. Small bilateral extrarenal pelves.    BLADDER: Within normal limits.  REPRODUCTIVE ORGANS: Unremarkable uterus.    BOWEL: No bowel obstruction. Thickened noninflamed appendix measuring 1.1 cm in diameter with submucosal fat and no surrounding edema. The appendix terminates in a 10.8 x 4.1 x 4.0 cm partially rim calcified cystic structure in the right pelvis with adjacent 1.9 cm cyst. Sigmoid diverticulosis without diverticulitis.  PERITONEUM: No ascites.  VESSELS: Atherosclerotic changes.  RETROPERITONEUM/LYMPH NODES: No lymphadenopathy.  ABDOMINAL WALL: Small fat-containing umbilical hernia.  BONES: Status post ORIF of the left hip with 3 metallic screws. Mild degenerative changes. Grade 1 L4-5 anterolisthesis.    IMPRESSION:    Evaluation of the solid organs, vascular structures and GI tract is limited without oral and IV contrast.    Thickened noninflamed appendix measuring 1.1 cm in diameter with submucosal fat and no surrounding edema. The appendix terminates in a 10.8 x 4.1 x 4.0 cm partially rim calcified cystic structure in the right pelvis with adjacent 1.9 cm cyst. 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 Ovarian and right fallopian tube pathology is not excluded. MRI may be of diagnostic benefit.    Cholelithiasis.       Hepatic US   FINDINGS:    Liver: 15.7 cm. Diffusely increased echogenicity.  Bile ducts: Normal caliber. Common bile duct measures 8 mm.  Gallbladder: No stones or wall thickening.  Pancreas: Visualized portions are within normal limits.  Right kidney: 10.4 cm. 5 mm sized cortical cyst. Slightly prominent right renal pelvis.  Ascites: None.  Aorta and IVC: Visualized portions are within normal limits.    IMPRESSION:    Hepatic steatosis.  No hydronephrosis or urolithiasis.          [facility]  [place]  # [id number]    Patient is a 89y old  Female who presents with a chief complaint of chest / epigastric pain, Gram negative bacteremia (01 Oct 2020 07:52)    HPI:  89 year old [place]ese speaking AAOx3 female, lives with daughter, presented to ED initially on 9/29/2020 for one day history of epigastric /  mid sternal pain a/w fever (39C) and 3 episodes of NBNB vomiting the day prior of admission. She was seen at ED, found to have positive UA and was discharged with antibiotis, however she was called back because of positive blood culture growing E. coli and was admitted on 9/29 and started on ceftriaxone on 9/30 with metronidazole added on 10/1. Her initial UA was positive, but urine culture was negative (even prior antibiotics).  She was hemodynamically stable, without recorded fever in hospital. Labs were significant for leukocytosis (13.1), neutrophilia (11.52) and abnormal liver enzymes and hyperbilirubinemia. On admission , , , Se total bilirubin 2.4, albumin 3.3. During hospitalization leukocytosis improved (9.65), with improving liver tests (-104, -38, -150, serum bilirubin normalized, 0.8).  US abdomen suggested hepatic steatosis, normal bile ducts and GB. CT abd/pelvis w/o contrast showed cholelithiasis but again normal bile ducts and no cholecystitis. It also revealed a RLQ structure, reported as possible appendiceal mucocele, or ovarian/R Fallopian pathology.  She denied prior history or family history of liver disease or cancer (other than grandfather unknown cancer). She mentioned decades long RUQ pain in the morning on awakening, resolves on its own and has not changed in nature. She denied taking pain medications, herbal supplements, alcohol, drugs. She also c/o worsening SOB on exertion, but states that slowly she can walk an hour. ROS was negative otherwise, she denied urinary symptoms.   Hepatology was consulted for elevated liver enzymes.   Notably she was also noted to have RLE wound 2/2 dog bite.     GOC: DNR/DNI, Molst form placed in chart. (29 Sep 2020 23:10)    PMH/PSH:  PAST MEDICAL & SURGICAL HISTORY:  Anxiety  HTN (hypertension)  S/P hip replacement      FH:  FAMILY HISTORY: grandfather unknown cancer      MEDS:  MEDICATIONS  (STANDING):  amLODIPine   Tablet 5 milliGRAM(s) Oral daily  cefTRIAXone   IVPB 1000 milliGRAM(s) IV Intermittent every 24 hours  enoxaparin Injectable 40 milliGRAM(s) SubCutaneous daily  escitalopram 10 milliGRAM(s) Oral daily  influenza   Vaccine 0.5 milliLiter(s) IntraMuscular once  lisinopril 40 milliGRAM(s) Oral daily  melatonin 5 milliGRAM(s) Oral at bedtime  metoprolol tartrate 25 milliGRAM(s) Oral every 12 hours  metroNIDAZOLE  IVPB      metroNIDAZOLE  IVPB 500 milliGRAM(s) IV Intermittent every 8 hours  pantoprazole    Tablet 40 milliGRAM(s) Oral before breakfast    MEDICATIONS  (PRN):  acetaminophen   Tablet .. 650 milliGRAM(s) Oral every 6 hours PRN Temp greater or equal to 38C (100.4F)  furosemide    Tablet 40 milliGRAM(s) Oral daily PRN pedal edema  temazepam 7.5 milliGRAM(s) Oral at bedtime PRN Insomnia    Allergies  No Known Allergies      CONSTITUTIONAL:  No weight loss, had fever at home (one episode 39) with chills   HEENT:  Eyes:  No visual loss, blurred vision, double vision or yellow sclerae. Ears, Nose, Throat:  No hearing loss, sneezing, congestion, runny nose or sore throat.  SKIN:  No rash or itching.  CARDIOVASCULAR:  No chest pain, chest pressure or chest discomfort. No palpitations or edema.  RESPIRATORY:  No shortness of breath, cough or sputum. (Mentions BATRES, but walks an hour/day)  GASTROINTESTINAL:  Had epigastric pain on admission, a/w 3 episodes of NBNB vomiting prior admission, denies diarrhea, blood in stool, melena  GENITOURINARY:  No dysuria, hematuria, urinary frequency  NEUROLOGICAL:  No headache, dizziness, syncope, paralysis, ataxia, numbness or tingling in the extremities. No change in bowel or bladder control.  MUSCULOSKELETAL:  No muscle, back pain, joint pain or stiffness.  HEMATOLOGIC:  No anemia, bleeding or bruising.  LYMPHATICS:  No enlarged nodes. No history of splenectomy.  PSYCHIATRIC:  No history of depression or anxiety.  ENDOCRINOLOGIC:  No reports of sweating, cold or heat intolerance. No polyuria or polydipsia.      ______________________________________________________________________  PHYSICAL EXAM:  T(C): 37.1 (10-01-20 @ 04:59), Max: 37.1 (10-01-20 @ 04:59)  HR: 65 (10-01-20 @ 04:59)  BP: 142/71 (10-01-20 @ 04:59)  RR: 17 (10-01-20 @ 04:59)  SpO2: 96% (10-01-20 @ 04:59)  Wt(kg): --      GEN: NAD, normocephalic  CVS: RRR  CHEST: clear to auscultation b/l  ABD: soft , nondistended, normoactive bowel sounds, mild periumbiliacal tenderness, neg Fajardo, no hepatosplenomegaly, no rebound or guarding, no CVA tenderness, no tenderness at McBurney point, neg Rovsing sign  EXTR: no cyanosis, no clubbing, no edema  NEURO: Awake and alert; oriented to person, place, time, and situation   SKIN:  warm;  non icteric    ______________________________________________________________________  LABS:                        11.5   6.42  )-----------( 190      ( 01 Oct 2020 06:31 )             34.5     10-01    137  |  105  |  12  ----------------------------<  104<H>  4.0   |  26  |  0.83    Ca    8.7      01 Oct 2020 06:31  Phos  2.8     09-30  Mg     2.2     09-30    TPro  6.4  /  Alb  2.8<L>  /  TBili  0.8  /  DBili  x   /  AST  38  /  ALT  104<H>  /  AlkPhos  150<H>  10-01    LIVER FUNCTIONS - ( 01 Oct 2020 06:31 )  Alb: 2.8 g/dL / Pro: 6.4 g/dL / ALK PHOS: 150 U/L / ALT: 104 U/L DA / AST: 38 U/L / GGT: x           PT/INR - ( 01 Oct 2020 06:31 )   PT: 12.7 sec;   INR: 1.09 ratio           ____________________________________________    IMAGING:    ______________________________________________________________________  Non-contrast CT A/P   LIVER: Within normal limits.  BILE DUCTS: Normal caliber.  GALLBLADDER: Cholelithiasis.  SPLEEN: Within normal limits.  PANCREAS: Within normal limits.  ADRENALS: Within normal limits.  KIDNEYS/URETERS: No hydroureteronephrosis or calculi. Small bilateral extrarenal pelves.    BLADDER: Within normal limits.  REPRODUCTIVE ORGANS: Unremarkable uterus.    BOWEL: No bowel obstruction. Thickened noninflamed appendix measuring 1.1 cm in diameter with submucosal fat and no surrounding edema. The appendix terminates in a 10.8 x 4.1 x 4.0 cm partially rim calcified cystic structure in the right pelvis with adjacent 1.9 cm cyst. Sigmoid diverticulosis without diverticulitis.  PERITONEUM: No ascites.  VESSELS: Atherosclerotic changes.  RETROPERITONEUM/LYMPH NODES: No lymphadenopathy.  ABDOMINAL WALL: Small fat-containing umbilical hernia.  BONES: Status post ORIF of the left hip with 3 metallic screws. Mild degenerative changes. Grade 1 L4-5 anterolisthesis.    IMPRESSION:    Evaluation of the solid organs, vascular structures and GI tract is limited without oral and IV contrast.    Thickened noninflamed appendix measuring 1.1 cm in diameter with submucosal fat and no surrounding edema. The appendix terminates in a 10.8 x 4.1 x 4.0 cm partially rim calcified cystic structure in the right pelvis with adjacent 1.9 cm cyst. 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 Ovarian and right fallopian tube pathology is not excluded. MRI may be of diagnostic benefit.    Cholelithiasis.       Hepatic US   FINDINGS:    Liver: 15.7 cm. Diffusely increased echogenicity.  Bile ducts: Normal caliber. Common bile duct measures 8 mm.  Gallbladder: No stones or wall thickening.  Pancreas: Visualized portions are within normal limits.  Right kidney: 10.4 cm. 5 mm sized cortical cyst. Slightly prominent right renal pelvis.  Ascites: None.  Aorta and IVC: Visualized portions are within normal limits.    IMPRESSION:    Hepatic steatosis.  No hydronephrosis or urolithiasis.

## 2020-10-01 NOTE — PROGRESS NOTE ADULT - PROBLEM SELECTOR PLAN 3
-Patients AST, ALT, ALP and bilirubin are elevated,now downtrending  -US done and noted showed hepatic steatosis, no  gall bladder pathology noted   will continue to trend  -CT Thickened noninflamed appendix measuring 1.1 cm in diameter with submucosal fat and no surrounding edema. The appendix terminates in a 10.8 x 4.1 x 4.0 cm partially rim calcified cystic structure in the right pelvis with adjacent 1.9 cm cyst. Consider appendiceal mucocele -Patients AST, ALT, ALP and bilirubin were elevated on admission  -AST and T bili normal, ALT and Alk Phos are down trending  -US done and noted showed hepatic steatosis, no  gall bladder pathology  -CT Thickened noninflamed appendix measuring 1.1 cm in diameter with submucosal fat and no surrounding edema. The appendix terminates in a 10.8 x 4.1 x 4.0 cm partially rim calcified cystic structure in the right pelvis with adjacent 1.9 cm cyst. Consider appendiceal mucocele -Patients AST, ALT, ALP and bilirubin were elevated on admission  -AST and T bili normal, ALT and Alk Phos are down trending  -US done and noted showed hepatic steatosis, no  gall bladder pathology  -CT Thickened noninflamed appendix measuring 1.1 cm in diameter with submucosal fat and no surrounding edema. The appendix terminates in a 10.8 x 4.1 x 4.0 cm partially rim calcified cystic structure in the right pelvis with adjacent 1.9 cm cyst. Consider appendiceal mucocele  -MRI abdomen and pelvis with contrast and MRCP tomorrow to rule out cholangitis.

## 2020-10-01 NOTE — ADVANCED PRACTICE NURSE CONSULT - ASSESSMENT
This is a 89yr old female patient admitted for Bacteremia, presenting with a R. De Anda scabbed Abrasion (3cm x 1cm x 0.1cm) with eschar, no drainage, and periwound erythema

## 2020-10-01 NOTE — CONSULT NOTE ADULT - PROBLEM SELECTOR RECOMMENDATION 9
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, based on [place] guidelines (2018) suspected acute cholangitis, also has cholelithiasis, might also have passed a stone  LFT's now rapidly downtrending, bilirubin normalized; not in liver failure   Please, obtain MRI abd/MRCP  Please obtain amylase, lipase  Continue to monitor LFTs, including INR  Avoid hepatotoxic meds when medically feasible  C/w antibiotics as per ID 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 coli bacteremia, cholelithiasis and rapid improvement of liver tests

## 2020-10-01 NOTE — PROGRESS NOTE ADULT - PROBLEM SELECTOR PLAN 1
-Patients blood cultures grew e coli  -On Rocephin day 1  -Source of bacteremia can be UTI as her UA was positive from yesterday with fever on presentation.   -US hepatic and pancreatic done and noted , no intra abdominal pathology noted except for  10.4 cm. 5 mm sized cortical cyst in kidney.  -Pending second blood culture  -CT abdomen and pelvis thickened noninflamed appendix measuring 1.1 cm in diameter with submucosal fat and no surrounding edema. The appendix terminates in a 10.8 x 4.1 x 4.0 cm partially rim calcified cystic structure in the right pelvis with adjacent 1.9 cm cyst. Consider appendiceal mucocele -Patients blood cultures grew E coli  -Source of bacteremia can be UTI but urine culture was neg  -Could be GI source, Dr Worthy and Dr Diaz are following  -US hepatic and pancreatic done , no intra abdominal pathology noted except for 10.4 cm. 5 mm sized cortical cyst in kidney.  -CT abdomen and pelvis thickened noninflamed appendix measuring 1.1 cm in diameter with submucosal fat and no surrounding edema. The appendix terminates in a 10.8 x 4.1 x 4.0 cm partially rim calcified cystic structure in the right pelvis with adjacent 1.9 cm cyst. Consider appendiceal mucocele  -Second culture showed no growth prelim  -On Rocephin day 2  -GI and hepatology are following  -ID Dr. Garcia

## 2020-10-01 NOTE — CONSULT NOTE ADULT - PROBLEM SELECTOR RECOMMENDATION 2
CT showed thickened noninflamed appendix measuring 1.1 cm in diameter with submucosal fat and no surrounding edema with The appendix terminates in a 10.8 x 4.1 x 4.0 cm partially rim calcified cystic structure in the right pelvis with adjacent 1.9 cm cyst.  may need colonoscopy will f/u with Dr. Davis CT showed thickened noninflamed appendix measuring 1.1 cm in diameter with submucosal fat and no surrounding edema with The appendix terminates in a 10.8 x 4.1 x 4.0 cm partially rim calcified cystic structure in the right pelvis with adjacent 1.9 cm cyst.  Would obtain MRI abd/pelvis, based on findings consider Gyn eval. CT showed thickened noninflamed appendix measuring 1.1 cm in diameter with submucosal fat and no surrounding edema with The appendix terminates in a 10.8 x 4.1 x 4.0 cm partially rim calcified cystic structure in the right pelvis with adjacent 1.9 cm cyst.  Would obtain MRI abd/pelvis, based on findings consider Gyn/ surg eval. CT showed thickened noninflamed appendix measuring 1.1 cm in diameter with submucosal fat and no surrounding edema with The appendix terminates in a 10.8 x 4.1 x 4.0 cm partially rim calcified cystic structure in the right pelvis with adjacent 1.9 cm cyst.  Would obtain MRI abd/pelvis, based on findings consider Gyn/ Surgery evaluation

## 2020-10-01 NOTE — PROGRESS NOTE ADULT - PROBLEM SELECTOR PLAN 2
-Patients ProBNP is elevated ( more than age corrected .  -CXR showed some discoid atelectasias .  -no signs of fluid over load on P/E  - Pending ECHO result  -will continue with ACE and b Blocker  -Lasix 40 mg PO added -Patients ProBNP was elevated ( more than age corrected .) on admission  -CXR showed some discoid atelectasias .  -no signs of fluid over load on P/E  -Normal EF, mild mitral regurgitation  -will continue with ACE and b Blocker  -Lasix 40 mg PO added

## 2020-10-01 NOTE — CONSULT NOTE ADULT - PROBLEM SELECTOR RECOMMENDATION 3
monitor LFT's if continues to downtrend can f/u with Dr. Weir in clinic at -[address] [place] [address].   Will monitor Would not cause this acute rise of liver test, but might have underlying fatty liver  Monitor LFT's, can f/u with Dr. Diaz in hepatology clinic at [address] [place] [address] when acute issues resolved.   Will monitor

## 2020-10-01 NOTE — PROGRESS NOTE ADULT - SUBJECTIVE AND OBJECTIVE BOX
PGY-1 Progress Note discussed with attending    PAGER #: [[phone]] TILL 5:00 PM  PLEASE CONTACT ON CALL TEAM:  - On Call Team (Please refer to Raphael) FROM 5:00 PM - 8:30PM  - Nightfloat Team FROM 8:30 -7:30 AM    CHIEF COMPLAINT & BRIEF HOSPITAL COURSE:  89 year old Cook Islander speaking female AAO X 3 , lives with daughter presented to ED for epigastric /  mid sternal pain. Patient had 3 episodes of NBNB vomiting yesterday . Patient came to ED yesterday for nausea , vomiting fever and epigastric pain , her UA . blood and urine cultures were sent she got Rocephin last night and she started feeling better and asked ED for discharge , she was discharged on Ceftin . Her blood cultures grew gram negative rods and she was called back to hospital. currentl patient complains of mid sternal pain and some time RUQ pain when she wakes up, as per patient her RUQ pain is chronic in nature and is been there for many years. dDaughter at bed side also added that she is getting moer short of breath these days. Patient denies head ache, dizziness, numbness, tingling, palpitaions, bowel or urinary prblems.    INTERVAL HPI/OVERNIGHT EVENTS:       REVIEW OF SYSTEMS:  CONSTITUTIONAL: No fever, weight loss, or fatigue  RESPIRATORY: No cough, wheezing, chills or hemoptysis; No shortness of breath  CARDIOVASCULAR: No chest pain, palpitations, dizziness, or leg swelling  GASTROINTESTINAL: No abdominal pain. No nausea, vomiting, or hematemesis; No diarrhea or constipation. No melena or hematochezia.  GENITOURINARY: No dysuria or hematuria, urinary frequency  NEUROLOGICAL: No headaches, memory loss, loss of strength, numbness, or tremors  SKIN: No itching, burning, rashes, or lesions     Vital Signs Last 24 Hrs  T(C): 37.1 (01 Oct 2020 04:59), Max: 37.1 (01 Oct 2020 04:59)  T(F): 98.7 (01 Oct 2020 04:59), Max: 98.7 (01 Oct 2020 04:59)  HR: 65 (01 Oct 2020 04:59) (65 - 75)  BP: 142/71 (01 Oct 2020 04:59) (140/51 - 172/67)  BP(mean): --  RR: 17 (01 Oct 2020 04:59) (16 - 17)  SpO2: 96% (01 Oct 2020 04:59) (95% - 96%)    PHYSICAL EXAMINATION:  GENERAL: NAD, well built  HEAD:  Atraumatic, Normocephalic  EYES:  conjunctiva and sclera clear  NECK: Supple, No JVD, Normal thyroid  CHEST/LUNG: Clear to auscultation. Clear to percussion bilaterally; No rales, rhonchi, wheezing, or rubs  HEART: Regular rate and rhythm; No murmurs, rubs, or gallops  ABDOMEN: Soft, Nontender, Nondistended; Bowel sounds present  NERVOUS SYSTEM:  Alert & Oriented X3,    EXTREMITIES:  2+ Peripheral Pulses, No clubbing, cyanosis, or edema  SKIN: warm dry                          11.5   6.42  )-----------( 190      ( 01 Oct 2020 06:31 )             34.5     10-01    137  |  105  |  12  ----------------------------<  104<H>  4.0   |  26  |  0.83    Ca    8.7      01 Oct 2020 06:31  Phos  2.8     09-30  Mg     2.2     09-30    TPro  6.4  /  Alb  2.8<L>  /  TBili  0.8  /  DBili  x   /  AST  38  /  ALT  104<H>  /  AlkPhos  150<H>  10-01    LIVER FUNCTIONS - ( 01 Oct 2020 06:31 )  Alb: 2.8 g/dL / Pro: 6.4 g/dL / ALK PHOS: 150 U/L / ALT: 104 U/L DA / AST: 38 U/L / GGT: x           CARDIAC MARKERS ( 30 Sep 2020 12:25 )  <0.015 ng/mL / x     / 118 U/L / x     / 1.7 ng/mL  CARDIAC MARKERS ( 30 Sep 2020 06:09 )  <0.015 ng/mL / x     / x     / x     / x          PT/INR - ( 01 Oct 2020 06:31 )   PT: 12.7 sec;   INR: 1.09 ratio             CAPILLARY BLOOD GLUCOSE      RADIOLOGY & ADDITIONAL TESTS:  Thickened noninflamed appendix measuring 1.1 cm in diameter with submucosal fat and no surrounding edema. The appendix terminates in a 10.8 x 4.1 x 4.0 cm partially rim calcified cystic structure in the right pelvis with adjacent 1.9 cm cyst. 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 Ovarian and right fallopian tube pathology is not excluded. MRI may be of diagnostic benefit.    Cholelithiasis.                   PGY-1 Progress Note discussed with attending    PAGER #: [[phone]] TILL 5:00 PM  PLEASE CONTACT ON CALL TEAM:  - On Call Team (Please refer to Raphael) FROM 5:00 PM - 8:30PM  - Nightfloat Team FROM 8:30 -7:30 AM    CHIEF COMPLAINT & BRIEF HOSPITAL COURSE:  89 year old Yemeni speaking female AAO X 3 , lives with daughter presented to ED for epigastric /  mid sternal pain. Patient had 3 episodes of NBNB vomiting yesterday . Patient came to ED yesterday for nausea , vomiting fever and epigastric pain , her UA . blood and urine cultures were sent she got Rocephin last night and she started feeling better and asked ED for discharge , she was discharged on Ceftin . Her blood cultures grew gram negative rods and she was called back to hospital. currentl patient complains of mid sternal pain and some time RUQ pain when she wakes up, as per patient her RUQ pain is chronic in nature and is been there for many years. dDaughter at bed side also added that she is getting moer short of breath these days. Patient denies head ache, dizziness, numbness, tingling, palpitaions, bowel or urinary problems.    INTERVAL HPI/ OVERNIGHT EVENTS: No active issue overnight. Pt assessed at bedside, Beninese speaking, denied any chest pian, difficulty in breathing, complain of some epigastric pain but no nausea and vomiting.       REVIEW OF SYSTEMS:  CONSTITUTIONAL: No fever, weight loss, or fatigue  RESPIRATORY: No cough, wheezing, chills or hemoptysis; No shortness of breath  CARDIOVASCULAR: No chest pain, palpitations, dizziness, or leg swelling  GASTROINTESTINAL: mild abdominal pain. No nausea, vomiting, or hematemesis; No diarrhea or constipation. No melena or hematochezia.  GENITOURINARY: No dysuria or hematuria, urinary frequency  NEUROLOGICAL: No headaches, memory loss, loss of strength, numbness, or tremors  SKIN: No itching, burning, rashes, or lesions     Vital Signs Last 24 Hrs  T(C): 37.1 (01 Oct 2020 04:59), Max: 37.1 (01 Oct 2020 04:59)  T(F): 98.7 (01 Oct 2020 04:59), Max: 98.7 (01 Oct 2020 04:59)  HR: 65 (01 Oct 2020 04:59) (65 - 75)  BP: 142/71 (01 Oct 2020 04:59) (140/51 - 172/67)  BP(mean): --  RR: 17 (01 Oct 2020 04:59) (16 - 17)  SpO2: 96% (01 Oct 2020 04:59) (95% - 96%)    PHYSICAL EXAMINATION:  GENERAL: NAD, well built  HEAD:  Atraumatic, Normocephalic  EYES:  conjunctiva and sclera clear  NECK: Supple, No JVD, Normal thyroid  CHEST/LUNG: Clear to auscultation. Clear to percussion bilaterally; No rales, rhonchi, wheezing, or rubs  HEART: Regular rate and rhythm; No murmurs, rubs, or gallops  ABDOMEN: Soft, Nontender, Nondistended; Bowel sounds present  NERVOUS SYSTEM:  Alert & Oriented X3,    EXTREMITIES:  2+ Peripheral Pulses, No clubbing, cyanosis, or edema  SKIN: warm dry                          11.5   6.42  )-----------( 190      ( 01 Oct 2020 06:31 )             34.5     10-01    137  |  105  |  12  ----------------------------<  104<H>  4.0   |  26  |  0.83    Ca    8.7      01 Oct 2020 06:31  Phos  2.8     09-30  Mg     2.2     09-30    TPro  6.4  /  Alb  2.8<L>  /  TBili  0.8  /  DBili  x   /  AST  38  /  ALT  104<H>  /  AlkPhos  150<H>  10-01    LIVER FUNCTIONS - ( 01 Oct 2020 06:31 )  Alb: 2.8 g/dL / Pro: 6.4 g/dL / ALK PHOS: 150 U/L / ALT: 104 U/L DA / AST: 38 U/L / GGT: x           CARDIAC MARKERS ( 30 Sep 2020 12:25 )  <0.015 ng/mL / x     / 118 U/L / x     / 1.7 ng/mL  CARDIAC MARKERS ( 30 Sep 2020 06:09 )  <0.015 ng/mL / x     / x     / x     / x          PT/INR - ( 01 Oct 2020 06:31 )   PT: 12.7 sec;   INR: 1.09 ratio             CAPILLARY BLOOD GLUCOSE      RADIOLOGY & ADDITIONAL TESTS:  Thickened noninflamed appendix measuring 1.1 cm in diameter with submucosal fat and no surrounding edema. The appendix terminates in a 10.8 x 4.1 x 4.0 cm partially rim calcified cystic structure in the right pelvis with adjacent 1.9 cm cyst. 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 Ovarian and right fallopian tube pathology is not excluded. MRI may be of diagnostic benefit.    Cholelithiasis.

## 2020-10-01 NOTE — ADVANCED PRACTICE NURSE CONSULT - RECOMMEDATIONS
-Clean the R. De Anda wound with normal saline and apply skin prep to the surrounding skin  -Apply MediHoney ointment to the wound bed and cover with a FOam dressing Q 48hrs PRN  -Encourage the patient to reposition Q 2hrs using wedges or pillows

## 2020-10-02 ENCOUNTER — TRANSCRIPTION ENCOUNTER (OUTPATIENT)
Age: 85
End: 2020-10-02

## 2020-10-02 VITALS — HEART RATE: 82 BPM | SYSTOLIC BLOOD PRESSURE: 126 MMHG | DIASTOLIC BLOOD PRESSURE: 62 MMHG

## 2020-10-02 LAB
ALBUMIN SERPL ELPH-MCNC: 3.2 G/DL — LOW (ref 3.5–5)
ALP SERPL-CCNC: 141 U/L — HIGH (ref 40–120)
ALT FLD-CCNC: 82 U/L DA — HIGH (ref 10–60)
AMYLASE P1 CFR SERPL: 37 U/L — SIGNIFICANT CHANGE UP (ref 25–115)
ANION GAP SERPL CALC-SCNC: 6 MMOL/L — SIGNIFICANT CHANGE UP (ref 5–17)
AST SERPL-CCNC: 22 U/L — SIGNIFICANT CHANGE UP (ref 10–40)
BILIRUB DIRECT SERPL-MCNC: 0.1 MG/DL — SIGNIFICANT CHANGE UP (ref 0–0.2)
BILIRUB SERPL-MCNC: 0.6 MG/DL — SIGNIFICANT CHANGE UP (ref 0.2–1.2)
BUN SERPL-MCNC: 9 MG/DL — SIGNIFICANT CHANGE UP (ref 7–18)
CALCIUM SERPL-MCNC: 9.1 MG/DL — SIGNIFICANT CHANGE UP (ref 8.4–10.5)
CHLORIDE SERPL-SCNC: 102 MMOL/L — SIGNIFICANT CHANGE UP (ref 96–108)
CO2 SERPL-SCNC: 27 MMOL/L — SIGNIFICANT CHANGE UP (ref 22–31)
CREAT SERPL-MCNC: 0.91 MG/DL — SIGNIFICANT CHANGE UP (ref 0.5–1.3)
GLUCOSE SERPL-MCNC: 91 MG/DL — SIGNIFICANT CHANGE UP (ref 70–99)
HCT VFR BLD CALC: 37.7 % — SIGNIFICANT CHANGE UP (ref 34.5–45)
HGB BLD-MCNC: 12.6 G/DL — SIGNIFICANT CHANGE UP (ref 11.5–15.5)
LIDOCAIN IGE QN: 110 U/L — SIGNIFICANT CHANGE UP (ref 73–393)
MCHC RBC-ENTMCNC: 27.3 PG — SIGNIFICANT CHANGE UP (ref 27–34)
MCHC RBC-ENTMCNC: 33.4 GM/DL — SIGNIFICANT CHANGE UP (ref 32–36)
MCV RBC AUTO: 81.8 FL — SIGNIFICANT CHANGE UP (ref 80–100)
NRBC # BLD: 0 /100 WBCS — SIGNIFICANT CHANGE UP (ref 0–0)
PLATELET # BLD AUTO: 258 K/UL — SIGNIFICANT CHANGE UP (ref 150–400)
POTASSIUM SERPL-MCNC: 4.1 MMOL/L — SIGNIFICANT CHANGE UP (ref 3.5–5.3)
POTASSIUM SERPL-SCNC: 4.1 MMOL/L — SIGNIFICANT CHANGE UP (ref 3.5–5.3)
PROT SERPL-MCNC: 7 G/DL — SIGNIFICANT CHANGE UP (ref 6–8.3)
RBC # BLD: 4.61 M/UL — SIGNIFICANT CHANGE UP (ref 3.8–5.2)
RBC # FLD: 13.6 % — SIGNIFICANT CHANGE UP (ref 10.3–14.5)
SODIUM SERPL-SCNC: 135 MMOL/L — SIGNIFICANT CHANGE UP (ref 135–145)
WBC # BLD: 8.37 K/UL — SIGNIFICANT CHANGE UP (ref 3.8–10.5)
WBC # FLD AUTO: 8.37 K/UL — SIGNIFICANT CHANGE UP (ref 3.8–10.5)

## 2020-10-02 PROCEDURE — 83690 ASSAY OF LIPASE: CPT

## 2020-10-02 PROCEDURE — 36415 COLL VENOUS BLD VENIPUNCTURE: CPT

## 2020-10-02 PROCEDURE — 87086 URINE CULTURE/COLONY COUNT: CPT

## 2020-10-02 PROCEDURE — 82150 ASSAY OF AMYLASE: CPT

## 2020-10-02 PROCEDURE — 84145 PROCALCITONIN (PCT): CPT

## 2020-10-02 PROCEDURE — 99232 SBSQ HOSP IP/OBS MODERATE 35: CPT

## 2020-10-02 PROCEDURE — 87449 NOS EACH ORGANISM AG IA: CPT

## 2020-10-02 PROCEDURE — 86769 SARS-COV-2 COVID-19 ANTIBODY: CPT

## 2020-10-02 PROCEDURE — 72196 MRI PELVIS W/DYE: CPT | Mod: 26

## 2020-10-02 PROCEDURE — 82746 ASSAY OF FOLIC ACID SERUM: CPT

## 2020-10-02 PROCEDURE — 80061 LIPID PANEL: CPT

## 2020-10-02 PROCEDURE — 87040 BLOOD CULTURE FOR BACTERIA: CPT

## 2020-10-02 PROCEDURE — 85025 COMPLETE CBC W/AUTO DIFF WBC: CPT

## 2020-10-02 PROCEDURE — 93306 TTE W/DOPPLER COMPLETE: CPT

## 2020-10-02 PROCEDURE — 74183 MRI ABD W/O CNTR FLWD CNTR: CPT

## 2020-10-02 PROCEDURE — 83036 HEMOGLOBIN GLYCOSYLATED A1C: CPT

## 2020-10-02 PROCEDURE — 82607 VITAMIN B-12: CPT

## 2020-10-02 PROCEDURE — 74183 MRI ABD W/O CNTR FLWD CNTR: CPT | Mod: 26

## 2020-10-02 PROCEDURE — 96374 THER/PROPH/DIAG INJ IV PUSH: CPT

## 2020-10-02 PROCEDURE — 99285 EMERGENCY DEPT VISIT HI MDM: CPT | Mod: 25

## 2020-10-02 PROCEDURE — 82550 ASSAY OF CK (CPK): CPT

## 2020-10-02 PROCEDURE — 83735 ASSAY OF MAGNESIUM: CPT

## 2020-10-02 PROCEDURE — 99239 HOSP IP/OBS DSCHRG MGMT >30: CPT | Mod: GC

## 2020-10-02 PROCEDURE — 83605 ASSAY OF LACTIC ACID: CPT

## 2020-10-02 PROCEDURE — 82248 BILIRUBIN DIRECT: CPT

## 2020-10-02 PROCEDURE — 84100 ASSAY OF PHOSPHORUS: CPT

## 2020-10-02 PROCEDURE — 71045 X-RAY EXAM CHEST 1 VIEW: CPT

## 2020-10-02 PROCEDURE — 72196 MRI PELVIS W/DYE: CPT

## 2020-10-02 PROCEDURE — 93005 ELECTROCARDIOGRAM TRACING: CPT

## 2020-10-02 PROCEDURE — 87635 SARS-COV-2 COVID-19 AMP PRB: CPT

## 2020-10-02 PROCEDURE — 85610 PROTHROMBIN TIME: CPT

## 2020-10-02 PROCEDURE — 80074 ACUTE HEPATITIS PANEL: CPT

## 2020-10-02 PROCEDURE — 74176 CT ABD & PELVIS W/O CONTRAST: CPT

## 2020-10-02 PROCEDURE — 83880 ASSAY OF NATRIURETIC PEPTIDE: CPT

## 2020-10-02 PROCEDURE — 84443 ASSAY THYROID STIM HORMONE: CPT

## 2020-10-02 PROCEDURE — 85027 COMPLETE CBC AUTOMATED: CPT

## 2020-10-02 PROCEDURE — 84484 ASSAY OF TROPONIN QUANT: CPT

## 2020-10-02 PROCEDURE — 82553 CREATINE MB FRACTION: CPT

## 2020-10-02 PROCEDURE — 76705 ECHO EXAM OF ABDOMEN: CPT

## 2020-10-02 PROCEDURE — 81001 URINALYSIS AUTO W/SCOPE: CPT

## 2020-10-02 PROCEDURE — 80053 COMPREHEN METABOLIC PANEL: CPT

## 2020-10-02 RX ORDER — CEFUROXIME AXETIL 250 MG
1 TABLET ORAL
Qty: 10 | Refills: 0
Start: 2020-10-02 | End: 2020-10-06

## 2020-10-02 RX ORDER — CEFUROXIME AXETIL 250 MG
1 TABLET ORAL
Qty: 18 | Refills: 0
Start: 2020-10-02 | End: 2020-10-10

## 2020-10-02 RX ORDER — PANTOPRAZOLE SODIUM 20 MG/1
1 TABLET, DELAYED RELEASE ORAL
Qty: 15 | Refills: 0
Start: 2020-10-02 | End: 2020-10-16

## 2020-10-02 RX ORDER — CEFUROXIME AXETIL 250 MG
250 TABLET ORAL EVERY 12 HOURS
Refills: 0 | Status: DISCONTINUED | OUTPATIENT
Start: 2020-10-03 | End: 2020-10-02

## 2020-10-02 RX ORDER — FUROSEMIDE 40 MG
1 TABLET ORAL
Qty: 30 | Refills: 0
Start: 2020-10-02 | End: 2020-10-31

## 2020-10-02 RX ADMIN — PANTOPRAZOLE SODIUM 40 MILLIGRAM(S): 20 TABLET, DELAYED RELEASE ORAL at 06:29

## 2020-10-02 RX ADMIN — ESCITALOPRAM OXALATE 10 MILLIGRAM(S): 10 TABLET, FILM COATED ORAL at 12:13

## 2020-10-02 RX ADMIN — Medication 100 MILLIGRAM(S): at 13:42

## 2020-10-02 RX ADMIN — CEFTRIAXONE 100 MILLIGRAM(S): 500 INJECTION, POWDER, FOR SOLUTION INTRAMUSCULAR; INTRAVENOUS at 09:09

## 2020-10-02 RX ADMIN — Medication 25 MILLIGRAM(S): at 17:43

## 2020-10-02 RX ADMIN — AMLODIPINE BESYLATE 5 MILLIGRAM(S): 2.5 TABLET ORAL at 06:29

## 2020-10-02 RX ADMIN — Medication 25 MILLIGRAM(S): at 07:00

## 2020-10-02 RX ADMIN — LISINOPRIL 40 MILLIGRAM(S): 2.5 TABLET ORAL at 06:29

## 2020-10-02 RX ADMIN — ENOXAPARIN SODIUM 40 MILLIGRAM(S): 100 INJECTION SUBCUTANEOUS at 12:12

## 2020-10-02 RX ADMIN — Medication 100 MILLIGRAM(S): at 06:29

## 2020-10-02 NOTE — PROGRESS NOTE ADULT - SUBJECTIVE AND OBJECTIVE BOX
89 y/o f Macanese speaking F with pmh of anxiety , HTN who admitted for  e. coli bacteremia, likely secondary to cholangitis.   Spoke with pt using Czech  id  [phone]. Pt's daughter at facetime also assisted for interpretation. Pt stated she feels fine other than lightheaded as she has not eat anything today morning. She denied fever, chills, cough, chest pain, nausea vomiting, abdominal pain.    Pt's repeat blood culture showed no growth. Pt is afebrile and WBC is within normal limit.     Meds:  cefTRIAXone   IVPB 1000 milliGRAM(s) IV Intermittent every 24 hours  metroNIDAZOLE  IVPB      metroNIDAZOLE  IVPB 500 milliGRAM(s) IV Intermittent every 8 hours    Allergies    No Known Allergies    Intolerances        VITALS:  Vital Signs Last 24 Hrs  T(C): 36.3 (02 Oct 2020 05:02), Max: 36.7 (01 Oct 2020 14:45)  T(F): 97.4 (02 Oct 2020 05:02), Max: 98 (01 Oct 2020 14:45)  HR: 68 (02 Oct 2020 05:02) (60 - 90)  BP: 134/65 (02 Oct 2020 05:02) (108/60 - 179/63)  BP(mean): --  RR: 17 (02 Oct 2020 05:02) (16 - 18)  SpO2: 95% (02 Oct 2020 05:02) (94% - 99%)    LABS/DIAGNOSTIC TESTS:                          12.6   8.37  )-----------( 258      ( 02 Oct 2020 09:41 )             37.7         10-02    135  |  102  |  9   ----------------------------<  91  4.1   |  27  |  0.91    Ca    9.1      02 Oct 2020 09:41    TPro  7.0  /  Alb  3.2<L>  /  TBili  0.6  /  DBili  0.1  /  AST  22  /  ALT  82<H>  /  AlkPhos  141<H>  10-02      LIVER FUNCTIONS - ( 02 Oct 2020 09:41 )  Alb: 3.2 g/dL / Pro: 7.0 g/dL / ALK PHOS: 141 U/L / ALT: 82 U/L DA / AST: 22 U/L / GGT: x             CULTURES: .Blood Blood-Peripheral  10-01 @ 10:59   No growth to date.  --  --      .Urine Clean Catch (Midstream)  09-30 @ 03:18   No growth  --  --      .Blood Blood  09-29 @ 22:25   No growth to date.  --  --      .Blood Blood  09-29 @ 22:23   No growth to date.  --  --      .Urine Catheterized  09-29 @ 06:43   <10,000 CFU/mL Normal Urogenital Ramandeep  --  --      .Blood Blood  09-29 @ 06:42   Growth in aerobic and anaerobic bottles: Escherichia coli  "Due to technical problems, Proteus sp. will Not be reported as part of  the BCID panel until further notice"  ***Blood Panel PCR results on this specimen are available  approximately 3 hours after the Gram stain result.***  Gram stain, PCR, and/or culture results may not always  correspond due to difference in methodologies.  ************************************************************  This PCR assay was performed using [employer].  The following targets are tested for: Enterococcus,  vancomycin resistant enterococci, Listeria monocytogenes,  coagulase negative staphylococci, S. aureus,  methicillin resistant S. aureus, Streptococcus agalactiae  (Group B), S. pneumoniae, S.pyogenes (Group A),  Acinetobacter baumannii, Enterobacter cloacae, E. coli,  Klebsiella oxytoca, K. pneumoniae, Proteus sp.,  Serratia marcescens, Haemophilus influenzae,  Neisseria meningitidis, Pseudomonas aeruginosa, Candida  albicans, C. glabrata, C krusei, C parapsilosis,  C. tropicalis and the KPC resistance gene.  --  Blood Culture PCR  Escherichia coli            RADIOLOGY: < from: CT Abdomen and Pelvis No Cont (09.30.20 @ 12:52) >    EXAM:  CT ABDOMEN AND PELVIS                            PROCEDURE DATE:  09/30/2020          INTERPRETATION:  CLINICAL INDICATION: 89 years  Female with rule out intra abdominal pathology. Bacteremia.    COMPARISON: None.    PROCEDURE:  CT of the Abdomen and Pelvis was performed without intravenous contrast.  Intravenous contrast: None.  Oral contrast: None.  Sagittal and coronal reformats were performed.    LIMITATIONS: Evaluation of the solid organs, vascular structures and GI tract is limited without oral and IV contrast.    FINDINGS:  LOWER CHEST: Bilateral lower lobe discoid atelectasis. 1.7 cm rounded pleural-based mass at the right lung base (2:13). Bilateral lower lobe scarring. Mild bilateral lower lobe traction bronchiectasis. Small sliding hiatus hernia.    LIVER: Within normal limits.  BILE DUCTS: Normal caliber.  GALLBLADDER: Cholelithiasis.  SPLEEN: Within normal limits.  PANCREAS: Within normal limits.  ADRENALS: Within normal limits.  KIDNEYS/URETERS: No hydroureteronephrosis or calculi. Small bilateral extrarenal pelves.    BLADDER: Within normal limits.  REPRODUCTIVE ORGANS: Unremarkable uterus.    BOWEL: No bowel obstruction. Thickened noninflamed appendix measuring 1.1 cm in diameter with submucosal fat and no surrounding edema. The appendix terminates in a 10.8 x 4.1 x 4.0 cm partially rim calcified cystic structure in the right pelvis with adjacent 1.9 cm cyst. Sigmoid diverticulosis without diverticulitis.  PERITONEUM: No ascites.  VESSELS: Atheroscleroticchanges.  RETROPERITONEUM/LYMPH NODES: No lymphadenopathy.  ABDOMINAL WALL: Small fat-containing umbilical hernia.  BONES: Status post ORIF of the left hip with 3 metallic screws. Mild degenerative changes. Grade 1 L4-5 anterolisthesis.    IMPRESSION:    Evaluation of the solid organs, vascular structures and GI tract is limited without oral and IV contrast.    Thickened noninflamed appendix measuring 1.1 cm in diameter with submucosal fat and no surrounding edema. The appendix terminates in a 10.8 x 4.1 x 4.0 cm partially rim calcified cystic structure in the right pelvis with adjacent 1.9 cm cyst. 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 Ovarian and right fallopian tube pathology is not excluded. MRI may be of diagnostic benefit.    Cholelithiasis.    Findings were discussed with Dr. Mccormack 9/30/2020 3:05 PM by Dr. Myles with read back confirmation.            ROS:  [  ] UNABLE TO ELICIT 89 y/o f Gabonese speaking F with pmh of anxiety , HTN who admitted for  e. coli bacteremia, likely secondary to cholangitis.   Spoke with pt using Niuean  id  [phone]. Pt's daughter at facetime also assisted for interpretation. Pt stated she feels fine other than lightheaded as she has not eat anything today morning. She denied fever, chills, cough, chest pain, nausea vomiting, abdominal pain.    Pt's repeat blood culture showed no growth. Pt is afebrile and WBC is within normal limit.   Pt is waiting to go for an MRI of abdomen today.  Meds:  cefTRIAXone   IVPB 1000 milliGRAM(s) IV Intermittent every 24 hours  metroNIDAZOLE  IVPB      metroNIDAZOLE  IVPB 500 milliGRAM(s) IV Intermittent every 8 hours    Allergies    No Known Allergies    Intolerances        VITALS:  Vital Signs Last 24 Hrs  T(C): 36.3 (02 Oct 2020 05:02), Max: 36.7 (01 Oct 2020 14:45)  T(F): 97.4 (02 Oct 2020 05:02), Max: 98 (01 Oct 2020 14:45)  HR: 68 (02 Oct 2020 05:02) (60 - 90)  BP: 134/65 (02 Oct 2020 05:02) (108/60 - 179/63)  BP(mean): --  RR: 17 (02 Oct 2020 05:02) (16 - 18)  SpO2: 95% (02 Oct 2020 05:02) (94% - 99%)    LABS/DIAGNOSTIC TESTS:                          12.6   8.37  )-----------( 258      ( 02 Oct 2020 09:41 )             37.7         10-02    135  |  102  |  9   ----------------------------<  91  4.1   |  27  |  0.91    Ca    9.1      02 Oct 2020 09:41    TPro  7.0  /  Alb  3.2<L>  /  TBili  0.6  /  DBili  0.1  /  AST  22  /  ALT  82<H>  /  AlkPhos  141<H>  10-02      LIVER FUNCTIONS - ( 02 Oct 2020 09:41 )  Alb: 3.2 g/dL / Pro: 7.0 g/dL / ALK PHOS: 141 U/L / ALT: 82 U/L DA / AST: 22 U/L / GGT: x             CULTURES: .Blood Blood-Peripheral  10-01 @ 10:59   No growth to date.  --  --      .Urine Clean Catch (Midstream)  09-30 @ 03:18   No growth  --  --      .Blood Blood  09-29 @ 22:25   No growth to date.  --  --      .Blood Blood  09-29 @ 22:23   No growth to date.  --  --      .Urine Catheterized  09-29 @ 06:43   <10,000 CFU/mL Normal Urogenital Ramandeep  --  --      .Blood Blood  09-29 @ 06:42   Growth in aerobic and anaerobic bottles: Escherichia coli              RADIOLOGY: < from: CT Abdomen and Pelvis No Cont (09.30.20 @ 12:52) >    EXAM:  CT ABDOMEN AND PELVIS                            PROCEDURE DATE:  09/30/2020          INTERPRETATION:  CLINICAL INDICATION: 89 years  Female with rule out intra abdominal pathology. Bacteremia.    COMPARISON: None.    PROCEDURE:  CT of the Abdomen and Pelvis was performed without intravenous contrast.  Intravenous contrast: None.  Oral contrast: None.  Sagittal and coronal reformats were performed.    LIMITATIONS: Evaluation of the solid organs, vascular structures and GI tract is limited without oral and IV contrast.    FINDINGS:  LOWER CHEST: Bilateral lower lobe discoid atelectasis. 1.7 cm rounded pleural-based mass at the right lung base (2:13). Bilateral lower lobe scarring. Mild bilateral lower lobe traction bronchiectasis. Small sliding hiatus hernia.    LIVER: Within normal limits.  BILE DUCTS: Normal caliber.  GALLBLADDER: Cholelithiasis.  SPLEEN: Within normal limits.  PANCREAS: Within normal limits.  ADRENALS: Within normal limits.  KIDNEYS/URETERS: No hydroureteronephrosis or calculi. Small bilateral extrarenal pelves.    BLADDER: Within normal limits.  REPRODUCTIVE ORGANS: Unremarkable uterus.    BOWEL: No bowel obstruction. Thickened noninflamed appendix measuring 1.1 cm in diameter with submucosal fat and no surrounding edema. The appendix terminates in a 10.8 x 4.1 x 4.0 cm partially rim calcified cystic structure in the right pelvis with adjacent 1.9 cm cyst. Sigmoid diverticulosis without diverticulitis.  PERITONEUM: No ascites.  VESSELS: Atheroscleroticchanges.  RETROPERITONEUM/LYMPH NODES: No lymphadenopathy.  ABDOMINAL WALL: Small fat-containing umbilical hernia.  BONES: Status post ORIF of the left hip with 3 metallic screws. Mild degenerative changes. Grade 1 L4-5 anterolisthesis.    IMPRESSION:    Evaluation of the solid organs, vascular structures and GI tract is limited without oral and IV contrast.    Thickened noninflamed appendix measuring 1.1 cm in diameter with submucosal fat and no surrounding edema. The appendix terminates in a 10.8 x 4.1 x 4.0 cm partially rim calcified cystic structure in the right pelvis with adjacent 1.9 cm cyst. 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 Ovarian and right fallopian tube pathology is not excluded. MRI may be of diagnostic benefit.    Cholelithiasis.    Findings were discussed with Dr. Mccormack 9/30/2020 3:05 PM by Dr. Myles with read back confirmation.            ROS:  [  ] UNABLE TO ELICIT

## 2020-10-02 NOTE — PROGRESS NOTE ADULT - PROBLEM SELECTOR PLAN 3
-Patients AST, ALT, ALP and bilirubin were elevated on admission  -AST and T bili normal, ALT and Alk Phos are down trending  -US done and noted showed hepatic steatosis, no  gall bladder pathology  -CT Thickened noninflamed appendix measuring 1.1 cm in diameter with submucosal fat and no surrounding edema. The appendix terminates in a 10.8 x 4.1 x 4.0 cm partially rim calcified cystic structure in the right pelvis with adjacent 1.9 cm cyst. 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

## 2020-10-02 NOTE — DISCHARGE NOTE PROVIDER - HOSPITAL COURSE
89 year old Namibian speaking female AAO X 3 , lives with daughter presented to ED for epigastric pain, nausea and vomiting and one episode of fever. She was started on IV antibiotics Rocephin. Switched to oral antibiotic Ceftin for 9 days.   She presented with UTI but negative urine culture, first blood culture positive for EColi. Liver enzymes were elevated on admission but ultrasound abdomen was unremarkable and CT abdomen showed suspected mucocele of appendix or ovarian mass. To rule out cholangitis or any ovarian mass MRI abdomen and Pelvis with IV contrast and MRI abdomen showed     89 year old Albanian speaking female AAO X 3 , lives with daughter presented to ED for epigastric pain, nausea and vomiting and one episode of fever. She was started on IV antibiotics Rocephin. Switched to oral antibiotic Ceftin for 9 days.   She presented with UTI but negative urine culture, first blood culture positive for EColi. Repeat blood culture was negative. Liver enzymes were elevated on admission but ultrasound abdomen was unremarkable and CT abdomen showed suspected mucocele of appendix or ovarian mass. To rule out cholangitis or any ovarian mass MRI abdomen and Pelvis with IV contrast and MRI abdomen showed     89 year old Honduran speaking female AAO X 3 , lives with daughter presented to ED for epigastric pain, nausea and vomiting and one episode of fever. She was started on IV antibiotics Rocephin. Switched to oral antibiotic Ceftin for 9 days.   She presented with UTI but negative urine culture, first blood culture positive for EColi. Repeat blood culture was negative. Liver enzymes were elevated on admission but ultrasound abdomen was unremarkable and CT abdomen showed suspected mucocele of appendix or ovarian mass. To rule out cholangitis or any ovarian mass MRI abdomen and Pelvis with IV contrast and MRI abdomen showed small gallstones and/or sludge in the gallbladder. No evidence for choledocholithiasis. 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     Patient medically ready to be discharged. Case discussed with attending.

## 2020-10-02 NOTE — PROGRESS NOTE ADULT - PROBLEM SELECTOR PLAN 2
-Patients ProBNP was elevated ( more than age corrected .) on admission  -CXR showed some discoid atelectasias .  -no signs of fluid over load on P/E  -Normal EF, mild mitral regurgitation  -will continue with ACE and b Blocker  -Lasix 40 mg PO added

## 2020-10-02 NOTE — PROGRESS NOTE ADULT - ATTENDING COMMENTS
Patient was examined and discussed with Dr. Mccormack.     Patient was admitted for chest pain.   She was discharged from ED with dx UTI.  Found to have E. coli bacteremia    She is alert and cooperative  Vital Signs Last 24 Hrs  T(C): 36.8 (09-30-20 @ 13:36), Max: 37.6 (09-30-20 @ 05:29)  T(F): 98.2 (09-30-20 @ 13:36), Max: 99.6 (09-30-20 @ 05:29)  HR: 69 (09-30-20 @ 13:36) (62 - 81)  BP: 140/51 (09-30-20 @ 13:36) (129/45 - 188/62)  BP(mean): --  RR: 17 (09-30-20 @ 13:36) (17 - 18)  SpO2: 95% (09-30-20 @ 13:36) (95% - 96%)  Lungs, clear  Cor, RRR  Abdomen, soft                        11.4   9.65  )-----------( 183      ( 30 Sep 2020 06:09 )             34.0   09-30    136  |  104  |  13  ----------------------------<  105<H>  3.9   |  23  |  1.00    Ca    8.4      30 Sep 2020 06:09  Phos  2.8     09-30  Mg     2.2     09-30    TPro  6.1  /  Alb  2.8<L>  /  TBili  1.8<H>  /  DBili  0.4<H>  /  AST  98<H>  /  ALT  157<H>  /  AlkPhos  168<H>  09-30    lture Results:   <10,000 CFU/mL Normal Urogenital Ramandeep (09.29.20 @ 06:43)       Historical Values  Culture Results:   <10,000 CFU/mL Normal Urogenital Ramandeep (09.29.20 @ 06:43) scherichia coli: Detec (09.29.20 @ 06:42)     rd< from: CT Abdomen and Pelvis No Cont (09.30.20 @ 12:52) >    Thickened noninflamed appendix measuring 1.1 cm in diameter with submucosal fat and no surrounding edema. The appendix terminates in a 10.8 x 4.1 x 4.0 cm partially rim calcified cystic structure in the right pelvis with adjacent 1.9 cm cyst. 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 Ovarian and right fallopian tube pathology is not excluded. MRI may be of diagnostic benefit.    Cholelithiasis.    Findings were discussed with Dr. Mccormack 9/30/2020 3:05 PM by Dr. Myles with read back confirmation.    < end of copied text >    IMP:  Chest pain, r/o ACS          E. coli bacteremia, unlikely to be a urinary source.  Cholangitis, vs periappendiceal collection, r/o ovarian abnormality (doubt)  Plan: Continue ctx.  Add flagyl.          Tele/troponin/echo.  Cardiology consultation, appreciated          Pelvic ultrasound
Patient was examined and discussed with Dr. Mccormack.  Daughter was present at the bedside.     She feels well.   Alert 89 yo woman in NAD  Vital Signs Last 24 Hrs  T(C): 36.3 (02 Oct 2020 05:02), Max: 36.3 (02 Oct 2020 05:02)  T(F): 97.4 (02 Oct 2020 05:02), Max: 97.4 (02 Oct 2020 05:02)  HR: 82 (02 Oct 2020 17:45) (65 - 82)  BP: 126/62 (02 Oct 2020 17:45) (126/62 - 179/63)  BP(mean): --  RR: 17 (02 Oct 2020 05:02) (17 - 18)  SpO2: 95% (02 Oct 2020 05:02) (94% - 96%)  Lungs, clear  Cor, RRR  Abdomen, soft, non-tender                        12.6   8.37  )-----------( 258      ( 02 Oct 2020 09:41 )             37.7   10-02    135  |  102  |  9   ----------------------------<  91  4.1   |  27  |  0.91    Ca    9.1      02 Oct 2020 09:41    TPro  7.0  /  Alb  3.2<L>  /  TBili  0.6  /  DBili  0.1  /  AST  22  /  ALT  82<H>  /  AlkPhos  141<H>  10-02    < from: MR Pelvis w/ IV Cont (10.02.20 @ 16:09) >    Additional findings:10.7 x 4.3 cm elongated cystic mass contiguous with the appendix. Mural calcifications seen on prior CT. 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 No mural nodularity.      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    < end of copied text >    < from: MR MRCP w/wo IV Cont (10.02.20 @ 16:09) >    9/30/2020.    Findings: Small gallstones and/or sludge in the gallbladder. No evidence for thickened gallbladder wall or pericholecystic fluid. The common bile duct measures up to 7 mm in caliber which is within normal limits for age. No evidence for choledocholithiasis. There is no hyperenhancement of the common bile duct mucosa. The main pancreatic duct maintains normal caliber without dilatation.    There is a 1.1 cm cyst in the left hepatic lobe. The pancreas, spleen, adrenals, and kidneys appear unremarkable except for small bilateral renal cysts.    Duodenal diverticula are present.    No evidence for ascites, or enlarged lymph node.    Impression:    Small gallstones and/or sludge in the gallbladder.    No evidence for choledocholithiasis.    Other findings as above.    < end of copied text >    IMP:  E. coli bacteremia, responded to antibiotic treatment.  Source is most likely a passed gallstone, as some remain, but are not obstructing.           Patient and her daughter are not interested in pursuing a surgical option.           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  Asymptomatic, incidental finding.   Plan: F/u with PMD for tumor markers and consideration of laparoscopic removal of the mucocele.           Continue ceftin x 5 more days.           I have left a message for Dr. Iqbal and will follow up with her.
Patient was examined and discussed with Dr. Mccormack.     She is alert and feels well.   Vital Signs Last 24 Hrs  T(C): 36.7 (01 Oct 2020 14:45), Max: 37.1 (01 Oct 2020 04:59)  T(F): 98 (01 Oct 2020 14:45), Max: 98.7 (01 Oct 2020 04:59)  HR: 60 (01 Oct 2020 17:49) (60 - 75)  BP: 108/60 (01 Oct 2020 17:49) (108/60 - 172/67)  BP(mean): --  RR: 16 (01 Oct 2020 17:49) (16 - 17)  SpO2: 98% (01 Oct 2020 17:49) (95% - 98%)  Abdomen, soft, non-tender                        11.5   6.42  )-----------( 190      ( 01 Oct 2020 06:31 )             34.5   10-01    137  |  105  |  12  ----------------------------<  104<H>  4.0   |  26  |  0.83    Ca    8.7      01 Oct 2020 06:31  Phos  2.8     09-30  Mg     2.2     09-30    TPro  6.4  /  Alb  2.8<L>  /  TBili  0.8  /  DBili  x   /  AST  38  /  ALT  104<H>  /  AlkPhos  150<H>  10-01    IMP:  E. coli bacteremia, likely intraabdominal source, possibly cholangitis with passed stone.           r/o mucocele, ovarian collection  Plan: Amylase, Lipase, MRI/MRCP in AM          Possible discharge tomorrow on oral antibiotics.
pt seen and  examined with ID resident

## 2020-10-02 NOTE — PROGRESS NOTE ADULT - REASON FOR ADMISSION
chest pain, gram negative bacteremia

## 2020-10-02 NOTE — PROGRESS NOTE ADULT - ASSESSMENT
89 year old Armenian speaking female AAO X 3 , lives with daughter presented to ED for epigastric /  mid sternal pain. Patient had 3 episodes of NBNB vomiting yesterday .   Patient admitted for treatment of bacteremia/ cellulitis
89 year old Sri Lankan speaking female AAO X 3 , lives with daughter presented to ED for epigastric /  mid sternal pain. Patient had 3 episodes of NBNB vomiting yesterday .   Patient admitted for treatment of bacteremia
89 year old Turkmen speaking female AAO X 3 , lives with daughter presented to ED for epigastric /  mid sternal pain. Patient had 3 episodes of NBNB vomiting yesterday .   Patient admitted for treatment of bacteremia/ cellulitis
89 year old female with a PMH of "Heart Murmur", umbilical hernia, HTN who presented with a chief complaint of epigastric pain, one episode of fever (39C), and 3 episodes of NBNB vomiting, found to have positive UA with negative UC, RLE wound 2/2 dog bite and E. coli bacteremia along with mixed pattern liver enzyme elevation with mild hyperbilirubinemia with hepatic steatosis on US abd, and cholelithiasis on CT abd, most likely cholangitis, possibly passed a stone. She did not have signs or symptoms of cholecystitis and had normal bile ducts on MRCP w/o evidence of cholelithiasis. Incidentally she was also found a RLQ structure reported as possible appendiceal mucocele vs. ovarian/Fallopian etiology, thus MRI abdomen was performed, that reports as diagnostic for appendiceal mucocele.    #Suspected cholangitis  #E coli bacteremia   - C/w antibiotics as per ID  - LFT's rapidly downtrending, bilirubin normalized; not in liver failure   - Avoid hepatotoxic meds when medically feasible  - MRCP was reviewed by Dr. Davis  - Patient and her daughter discussed with primary team and deferred any surgical intervention     #Appendiceal mucocele  - MRI also suggestive of appendiceal mucocele diagnosis, pt will follow up with PCP as per notes    #Hepatic steatosis  -can f/u outpatient    D/w primary team  Thank you for the consult
Bacteremia  Cholangitis    Plan - Cont Rocephin 1 gm iv q24hrs  Can DC home today or Tomorrow on Ceftin 250mgs po bid X 9 days  Reconsult prn

## 2020-10-02 NOTE — PROGRESS NOTE ADULT - SUBJECTIVE AND OBJECTIVE BOX
Full note to follow Chief Complaint:  Patient is a 89y old  Female who presents with a chief complaint of chest pain, gram negative bacteremia (02 Oct 2020 15:51)    Patients daughter (Rosi) at bedside assisted with translation    Reason for consult: Elevated liver enzymes    Interval Events:   Patient was seen and examined at bedside. Stated that feeling well, denied abdominal pain, N/V, tolerating food, had 2 BM today, no blood in it. No new complaints. S/p MRCP and MRI abd/pelvis.    Hospital Medications:  acetaminophen   Tablet .. 650 milliGRAM(s) Oral every 6 hours PRN  amLODIPine   Tablet 5 milliGRAM(s) Oral daily  enoxaparin Injectable 40 milliGRAM(s) SubCutaneous daily  escitalopram 10 milliGRAM(s) Oral daily  furosemide    Tablet 40 milliGRAM(s) Oral daily PRN  influenza   Vaccine 0.5 milliLiter(s) IntraMuscular once  lisinopril 40 milliGRAM(s) Oral daily  melatonin 5 milliGRAM(s) Oral at bedtime  metoprolol tartrate 25 milliGRAM(s) Oral every 12 hours  metroNIDAZOLE  IVPB      metroNIDAZOLE  IVPB 500 milliGRAM(s) IV Intermittent every 8 hours  ondansetron Injectable 4 milliGRAM(s) IV Push every 4 hours PRN  pantoprazole    Tablet 40 milliGRAM(s) Oral before breakfast  temazepam 7.5 milliGRAM(s) Oral at bedtime PRN      ROS:   General:  No  fevers, chills, night sweats, fatigue  Eyes:  Good vision, no reported pain  ENT:  No sore throat, pain, runny nose  CV:  No pain, palpitations  Pulm:  No dyspnea, cough  GI:  See HPI, otherwise negative  :  No  incontinence, nocturia  Muscle:  No pain, weakness  Neuro:  No memory problems  Psych:  No insomnia, mood problems, depression  Endocrine:  No polyuria, polydipsia, cold/heat intolerance  Heme:  No petechiae, ecchymosis, easy bruisability  Skin:  RLE wound w dressing    PHYSICAL EXAM:   Vital Signs:  Vital Signs Last 24 Hrs  T(C): 36.3 (02 Oct 2020 05:02), Max: 36.3 (02 Oct 2020 05:02)  T(F): 97.4 (02 Oct 2020 05:02), Max: 97.4 (02 Oct 2020 05:02)  HR: 82 (02 Oct 2020 17:45) (65 - 82)  BP: 126/62 (02 Oct 2020 17:45) (126/62 - 138/62)  BP(mean): --  RR: 17 (02 Oct 2020 05:02) (17 - 17)  SpO2: 95% (02 Oct 2020 05:02) (95% - 96%)  Daily     Daily Weight in k.9 (02 Oct 2020 05:02)    GENERAL: no acute distress  NEURO: alert, no asterixis  HEENT: anicteric sclera, no conjunctival pallor appreciated  CHEST: no respiratory distress, no accessory muscle use  CARDIAC: regular rate, rhythm  ABDOMEN: soft, non-tender, non-distended, no rebound or guarding  EXTREMITIES: warm, well perfused, no edema  SKIN: RLE wound w dresing    LABS: reviewed                        12.6   8.37  )-----------( 258      ( 02 Oct 2020 09:41 )             37.7     10-02    135  |  102  |  9   ----------------------------<  91  4.1   |  27  |  0.91    Ca    9.1      02 Oct 2020 09:41    TPro  7.0  /  Alb  3.2<L>  /  TBili  0.6  /  DBili  0.1  /  AST  22  /  ALT  82<H>  /  AlkPhos  141<H>  10    LIVER FUNCTIONS - ( 02 Oct 2020 09:41 )  Alb: 3.2 g/dL / Pro: 7.0 g/dL / ALK PHOS: 141 U/L / ALT: 82 U/L DA / AST: 22 U/L / GGT: x             Interval Diagnostic Studies: see sunrise for full report

## 2020-10-02 NOTE — PROGRESS NOTE ADULT - SUBJECTIVE AND OBJECTIVE BOX
PGY-1 Progress Note discussed with attending    PAGER #: [[phone]] TILL 5:00 PM  PLEASE CONTACT ON CALL TEAM:  - On Call Team (Please refer to Raphael) FROM 5:00 PM - 8:30PM  - Nightfloat Team FROM 8:30 -7:30 AM    CHIEF COMPLAINT & BRIEF HOSPITAL COURSE:  89 year old Icelandic speaking female AAO X 3 , lives with daughter presented to ED for epigastric /  mid sternal pain. Patient had 3 episodes of NBNB vomiting yesterday . Patient came to ED for nausea , vomiting fever and epigastric pain , patient complains of mid sternal pain and some time RUQ pain when she wakes up, as per patient her RUQ pain is chronic in nature and is been there for many years. daughter at bed side also added that she is getting more short of breath these days. Patient denies head ache, dizziness, numbness, tingling, palpitations bowel or urinary problems.  Blood culture grew ecoli, second blood culture is negative  Urine culture is negative, source is more likely intrabdominal    INTERVAL HPI/OVERNIGHT EVENTS: No issue overnight. Pt assessed at bedside. Denied nausea, vomiting , epigastric pain.       REVIEW OF SYSTEMS:  CONSTITUTIONAL: No fever, weight loss, or fatigue  RESPIRATORY: No cough, wheezing, chills or hemoptysis; No shortness of breath  CARDIOVASCULAR: No chest pain, palpitations, dizziness, or leg swelling  GASTROINTESTINAL: No abdominal pain. No nausea, vomiting, or hematemesis; No diarrhea or constipation. No melena or hematochezia.  GENITOURINARY: No dysuria or hematuria, urinary frequency  NEUROLOGICAL: No headaches, memory loss, loss of strength, numbness, or tremors  SKIN: No itching, burning, rashes, or lesions     Vital Signs Last 24 Hrs  T(C): 36.3 (02 Oct 2020 05:02), Max: 36.7 (01 Oct 2020 14:45)  T(F): 97.4 (02 Oct 2020 05:02), Max: 98 (01 Oct 2020 14:45)  HR: 68 (02 Oct 2020 05:02) (60 - 90)  BP: 134/65 (02 Oct 2020 05:02) (108/60 - 179/63)  BP(mean): --  RR: 17 (02 Oct 2020 05:02) (16 - 18)  SpO2: 95% (02 Oct 2020 05:02) (94% - 99%)    PHYSICAL EXAMINATION:  GENERAL: NAD, well built  HEAD:  Atraumatic, Normocephalic  EYES:  conjunctiva and sclera clear  NECK: Supple, No JVD, Normal thyroid  CHEST/LUNG: Clear to auscultation. Clear to percussion bilaterally; No rales, rhonchi, wheezing, or rubs  HEART: Regular rate and rhythm; No murmurs, rubs, or gallops  ABDOMEN: Soft, Nontender, Nondistended; Bowel sounds present  NERVOUS SYSTEM:  Alert & Oriented X3,    EXTREMITIES:  2+ Peripheral Pulses, No clubbing, cyanosis, or edema  SKIN: warm dry                          12.6   8.37  )-----------( 258      ( 02 Oct 2020 09:41 )             37.7     10-02    135  |  102  |  9   ----------------------------<  91  4.1   |  27  |  0.91    Ca    9.1      02 Oct 2020 09:41    TPro  7.0  /  Alb  3.2<L>  /  TBili  0.6  /  DBili  0.1  /  AST  22  /  ALT  82<H>  /  AlkPhos  141<H>  10-02    LIVER FUNCTIONS - ( 02 Oct 2020 09:41 )  Alb: 3.2 g/dL / Pro: 7.0 g/dL / ALK PHOS: 141 U/L / ALT: 82 U/L DA / AST: 22 U/L / GGT: x               PT/INR - ( 01 Oct 2020 06:31 )   PT: 12.7 sec;   INR: 1.09 ratio             CAPILLARY BLOOD GLUCOSE      RADIOLOGY & ADDITIONAL TESTS:    CT abdomen and pelvis without contrast  Thickened noninflamed appendix measuring 1.1 cm in diameter with submucosal fat and no surrounding edema. The appendix terminates in a 10.8 x 4.1 x 4.0 cm partially rim calcified cystic structure in the right pelvis with adjacent 1.9 cm cyst. 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 Ovarian and right fallopian tube pathology is not excluded. MRI may be of diagnostic benefit.

## 2020-10-02 NOTE — DISCHARGE NOTE PROVIDER - CARE PROVIDER_API CALL
DESIREE HERNANDEZ  [facility]  [address] [place] [address]  Phone: [phone]  Fax: [phone]  Follow Up Time:

## 2020-10-02 NOTE — DISCHARGE NOTE PROVIDER - NSDCCPCAREPLAN_GEN_ALL_CORE_FT
PRINCIPAL DISCHARGE DIAGNOSIS  Diagnosis: Bacteremia  Assessment and Plan of Treatment: You came with nause, vomiting and one episode of fever. On lab you had elevated white cell count and blood culutre showed bacteria EColi. Repeat blood cultures were negative. You came with urinary tract infection but with negative urine culture. We consulted our in house infectious disease doctor, GI docsimba and liver doctor. Your liver enzymese were elevated. ultrasound abdomen was unremarkable for any gall bladder or liver disease and CT abdomen showed suspected appendix mucocoele or some ovvarian mass on right side. Your urine culture was negative so we need to find out the source of infection in blood which could be due to cholangitits. Your MRI showed.......if negative then most likely you had gall bladder stone which obstructed your bile ducts and gave you the infection with the elevated liver enzymes and on imaging we were not able to see because it was already passed thought your ducts as your elevated liver enzymes have been decreased down and almost back to normal. Please visit your PCP  within a week after discharge and have a blood work to check on your liver enzymes. Continue to take your oral antibiotics on discharge as prescribed.      SECONDARY DISCHARGE DIAGNOSES  Diagnosis: Mid sternal chest pain  Assessment and Plan of Treatment: You came with mid sternal chest pain and epigastric pain. We consulted our in house cardiologist. EKG was normal. ECHO showed normal EF. Please continue to take your home medications for HTN. Our cardiologist recommended pain is more related to your abdomen rather than heart.    Diagnosis: Urinary tract infection  Assessment and Plan of Treatment: You came with urtinary tract infection on urine analysis but urine culture was negative. We treated you with IV antibiotics and will discharge on oral antibiotics. Take those as prescribed.    Diagnosis: Transaminitis  Assessment and Plan of Treatment: You came with abdominal pain on/off for many years an elevated liver enzymes and bilirubin. They have been decreasing down and bilirubin is back to normal. Ultrasound didnot show any liver or gall bladder pathology. MRI abdomen and Pelvis with IV contrast, MRCP showed .....  Please donot forget to see your PCP within a week after discharge.    Diagnosis: HTN (hypertension)  Assessment and Plan of Treatment: Continue with blood pressure medication. Maintain a healthy diet that consist of low sugar, low fat, low sodium diet. Exercise frequently if possible.  Follow up with primary care physician in one week after discharge.       PRINCIPAL DISCHARGE DIAGNOSIS  Diagnosis: Bacteremia  Assessment and Plan of Treatment: You came with nausea, vomiting and one episode of fever. On lab you had elevated white cell count and blood culutre showed bacteria EColi. Repeat blood cultures were negative. You came with urinary tract infection but with negative urine culture. We consulted our in house infectious disease doctor, GI docotr and liver doctor. Your liver enzymese were elevated. ultrasound abdomen was unremarkable for any gall bladder or liver disease. Your urine culture was negative so we need to find out the source of infection in blood which could be due to cholangitits. Your MRI showed a small amount of sludge and stones in the gallbladder, no other evidence of disease. Most likely you had gall bladder stone which obstructed your bile ducts and gave you the infection with the elevated liver enzymes. The stone likely already passed through your ducts as your elevated liver enzymes have been decreased down and almost back to normal. 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 Please visit your PCP  within a week after discharge and have a blood work to check on your liver enzymes. Continue to take your oral antibiotics on discharge as prescribed.      SECONDARY DISCHARGE DIAGNOSES  Diagnosis: Mid sternal chest pain  Assessment and Plan of Treatment: You came with mid sternal chest pain and epigastric pain. We consulted our in house cardiologist. EKG was normal. ECHO showed normal EF. Please continue to take your home medications for HTN. Our cardiologist recommended pain is more related to your abdomen rather than heart.    Diagnosis: Urinary tract infection  Assessment and Plan of Treatment: You came with urtinary tract infection on urine analysis but urine culture was negative. We treated you with IV antibiotics and will discharge on oral antibiotics. Take those as prescribed.    Diagnosis: HTN (hypertension)  Assessment and Plan of Treatment: Continue with blood pressure medication. Maintain a healthy diet that consist of low sugar, low fat, low sodium diet. Exercise frequently if possible.  Follow up with primary care physician in one week after discharge.      Diagnosis: Transaminitis  Assessment and Plan of Treatment: You came with abdominal pain on/off for many years an elevated liver enzymes and bilirubin. They have been decreasing down and bilirubin is back to normal. Ultrasound didnot show any liver or gall bladder pathology. MRI abdomen and Pelvis with IV contrast, MRCP showed gallbladder stones and sludge.   Please donot forget to see your PCP within a week after discharge.

## 2020-10-02 NOTE — PROGRESS NOTE ADULT - PROBLEM SELECTOR PLAN 1
-Patients blood cultures grew E coli  -Source of bacteremia can be UTI but urine culture was neg  -Could be GI source, Dr Worthy and Dr Diaz are following  -US hepatic and pancreatic done , no intra abdominal pathology noted except for 10.4 cm. 5 mm sized cortical cyst in kidney.  -CT abdomen and pelvis thickened noninflamed appendix measuring 1.1 cm in diameter with submucosal fat and no surrounding edema. The appendix terminates in a 10.8 x 4.1 x 4.0 cm partially rim calcified cystic structure in the right pelvis with adjacent 1.9 cm cyst. 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 Garcia -Patients blood cultures grew E coli  -Source of bacteremia can be UTI but urine culture was neg  -Could be GI source, Dr Worthy and Dr Diaz are following  -US hepatic and pancreatic done , no intra abdominal pathology noted except for 10.4 cm. 5 mm sized cortical cyst in kidney.  -CT abdomen and pelvis thickened noninflamed appendix measuring 1.1 cm in diameter with submucosal fat and no surrounding edema. The appendix terminates in a 10.8 x 4.1 x 4.0 cm partially rim calcified cystic structure in the right pelvis with adjacent 1.9 cm cyst. 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 Garcia

## 2020-10-02 NOTE — DISCHARGE NOTE NURSING/CASE MANAGEMENT/SOCIAL WORK - PATIENT PORTAL LINK FT
You can access the FollowMyHealth Patient Portal offered by [facility] by registering at the following website: [email or web address]. By joining [facility]’s FollowMyHealth portal, you will also be able to view your health information using other applications (apps) compatible with our system.

## 2020-10-02 NOTE — DISCHARGE NOTE PROVIDER - NSDCMRMEDTOKEN_GEN_ALL_CORE_FT
amLODIPine 5 mg oral tablet: 1 tab(s) orally once a day  bisoprolol 10 mg oral tablet: 1 tab(s) orally 2 times a day  cefuroxime 250 mg oral tablet: 1 tab(s) orally every 12 hours  escitalopram 10 mg oral tablet: 1 tab(s) orally once a day  furosemide 40 mg oral tablet: 1 tab(s) orally once a day, As needed, pedal edema  pantoprazole 40 mg oral delayed release tablet: 1 tab(s) orally once a day (before a meal)  ramipril 10 mg oral capsule: 1 cap(s) orally 2 times a day  temazepam 7.5 mg oral capsule: 1 cap(s) orally once a day (at bedtime)

## 2020-10-02 NOTE — PROGRESS NOTE ADULT - GASTROINTESTINAL DETAILS
bowel sounds normal/soft/nontender bowel sounds normal/no rebound tenderness/no rigidity/no guarding/nontender/no distention/soft/no masses palpable

## 2020-10-02 NOTE — PROGRESS NOTE ADULT - RS GEN PE MLT RESP DETAILS PC
breath sounds equal/clear to auscultation bilaterally no wheezes/no rhonchi/clear to auscultation bilaterally/no rales/breath sounds equal/good air movement

## 2020-10-04 LAB
CULTURE RESULTS: SIGNIFICANT CHANGE UP
CULTURE RESULTS: SIGNIFICANT CHANGE UP
SPECIMEN SOURCE: SIGNIFICANT CHANGE UP
SPECIMEN SOURCE: SIGNIFICANT CHANGE UP

## 2020-10-06 LAB
CULTURE RESULTS: SIGNIFICANT CHANGE UP
SPECIMEN SOURCE: SIGNIFICANT CHANGE UP

## 2020-10-08 ENCOUNTER — TRANSCRIPTION ENCOUNTER (OUTPATIENT)
Age: 85
End: 2020-10-08

## 2020-11-12 NOTE — CHART NOTE - NSCHARTNOTEFT_GEN_A_CORE
Followed up with patient's PMD, Dr. Iqbal [phone].  Patient was referred to see a surgeon, who advised and offered laparoscopic removal of appendiceal mucocoele.  Patient declined to have surgery, despite recommendation.

## 2021-02-01 ENCOUNTER — APPOINTMENT (OUTPATIENT)
Dept: ULTRASOUND IMAGING | Facility: HOSPITAL | Age: 86
End: 2021-02-01

## 2024-01-03 NOTE — ED PROVIDER NOTE - GASTROINTESTINAL NEGATIVE STATEMENT, MLM
[Well Nourished] : well nourished [Well Developed] : well developed [Alert] : ~L alert [Active] : active [Normal Breathing Pattern] : normal breathing pattern [No Respiratory Distress] : no respiratory distress [No Allergic Shiners] : no allergic shiners [No Drainage] : no drainage [No Conjunctivitis] : no conjunctivitis [Tympanic Membranes Clear] : tympanic membranes were clear [Nasal Mucosa Non-Edematous] : nasal mucosa non-edematous [No Nasal Drainage] : no nasal drainage [No Polyps] : no polyps [No Sinus Tenderness] : no sinus tenderness [No Oral Pallor] : no oral pallor [No Oral Cyanosis] : no oral cyanosis [Non-Erythematous] : non-erythematous [No Exudates] : no exudates [No Postnasal Drip] : no postnasal drip [No Tonsillar Enlargement] : no tonsillar enlargement [Absence Of Retractions] : absence of retractions [Symmetric] : symmetric [Good Expansion] : good expansion [No Acc Muscle Use] : no accessory muscle use [Good aeration to bases] : good aeration to bases [Equal Breath Sounds] : equal breath sounds bilaterally [No Crackles] : no crackles [No Rhonchi] : no rhonchi [No Wheezing] : no wheezing [Normal Sinus Rhythm] : normal sinus rhythm [No Heart Murmur] : no heart murmur [Soft, Non-Tender] : soft, non-tender [No Hepatosplenomegaly] : no hepatosplenomegaly [Non Distended] : was not ~L distended [Abdomen Mass (___ Cm)] : no abdominal mass palpated [Full ROM] : full range of motion no abdominal pain, no bloating, no constipation, no diarrhea, no nausea and no vomiting. [No Clubbing] : no clubbing [Capillary Refill < 2 secs] : capillary refill less than two seconds [No Cyanosis] : no cyanosis [No Petechiae] : no petechiae [No Kyphoscoliosis] : no kyphoscoliosis [No Contractures] : no contractures [Alert and  Oriented] : alert and oriented [No Abnormal Focal Findings] : no abnormal focal findings [Normal Muscle Tone And Reflexes] : normal muscle tone and reflexes [No Birth Marks] : no birth marks [No Rashes] : no rashes [No Skin Lesions] : no skin lesions [Tonsil Size ___] : tonsil size [unfilled] [FreeTextEntry2] : mild strabismus [FreeTextEntry4] :  , observed nasal mucosal edema and allergic shiners

## 2024-04-08 NOTE — PROGRESS NOTE ADULT - PROBLEM/PLAN-1
Dear Dr. Hopkins,    Thank you for allowing me to participate in the care of Beatriz Ugalde.  She has a very pleasant, very active 70-year-old lady.    Chief Complaint   Patient presents with    Office Visit     Rt ankle and side of foot pain and collapsing        HISTORY OF PRESENT ILLNESS:   The patient presents for previous pain to the medial right arch and ankle which was worse with ambulation.  She started focusing on her gait and this did help.      REVIEW OF SYSTEMS:   CONSTITUTIONAL:  Alert and oriented x3 with pleasant mood and affect.  Has good attention to grooming, normal nutrition and development.        ALLERGIES:    Allergies as of 04/08/2024 - Reviewed 04/08/2024   Allergen Reaction Noted    Lobster   (food) GI UPSET 04/02/2015       I have reviewed the patient's medications and allergies and past medical and surgical histories, updating these as appropriate.  ROS and PFSH reviewed with patient.  See Histories section of the EMR for a display of this information.    PHYSICAL EXAM:   DERMATOLOGICAL:  No break in tissue bilateral  VASCULAR:  No acute edema or ecchymosis right foot and ankle.  Pulses: Dorsalis pedis and posterior tibial pulses are two over four bilateral.  Capillary fill time is 1 second to all 10 toes.    NEUROLOGICAL:  No paresthesias right foot and ankle  MUSCULOSKELETAL:  Previous pain to palpation along the distal course of the tibialis posterior tendon right.  Palpable spurring dorsal medial arch bilateral, right more pronounced than left.  Significant hamstring tightness noted bilateral    DATA:    Reviewed previous encounter note as applicable.     RESULT REVIEW:  None today    No orders of the defined types were placed in this encounter.      ASSESSMENT:    1. Posterior tibial tendinitis of right lower extremity    2. Primary osteoarthritis of right foot    3. Hamstring tightness      not improving    PLAN:   Examined patient's feet.  Order for structured physical therapy to work  on strengthening of the tibialis posterior tendon, hamstring flexibility, and gait analysis.  Patient will get x-rays on the way out.     Instructions provided as documented in the AVS.    The patient indicated understanding of the diagnosis and agreed with the plan of care    Again, I thank you for allowing me to participate in this patient's care.    Sincerely,    Dileep Velazquez DPM     DISPLAY PLAN FREE TEXT

## 2024-04-18 NOTE — PATIENT PROFILE ADULT - NSPROMUTANXFEARFT_GEN_A_NUR
Procedure time of 0715 matches the surgical board time of 0715 at this time.       Immediately report any new sign of illness to our surgeon's office  Masking is optional at our facility (masks will be provided at entrances for those who wish to use one). If you would like teammates to wear a mask, please request this of your care team the day of surgery.  Our rooms are relatively small and if more than two visitors are coming with you to your procedure, we may ask that some individuals wait in the waiting room to minimize overcrowding.    
n/a

## 2024-10-31 ENCOUNTER — NON-APPOINTMENT (OUTPATIENT)
Age: 89
End: 2024-10-31